# Patient Record
Sex: FEMALE | Race: WHITE | NOT HISPANIC OR LATINO | Employment: FULL TIME | URBAN - METROPOLITAN AREA
[De-identification: names, ages, dates, MRNs, and addresses within clinical notes are randomized per-mention and may not be internally consistent; named-entity substitution may affect disease eponyms.]

---

## 2021-08-04 ENCOUNTER — OFFICE VISIT (OUTPATIENT)
Dept: URGENT CARE | Facility: CLINIC | Age: 41
End: 2021-08-04
Payer: COMMERCIAL

## 2021-08-04 VITALS
HEART RATE: 97 BPM | SYSTOLIC BLOOD PRESSURE: 115 MMHG | TEMPERATURE: 98 F | WEIGHT: 125 LBS | RESPIRATION RATE: 18 BRPM | BODY MASS INDEX: 18.51 KG/M2 | HEIGHT: 69 IN | DIASTOLIC BLOOD PRESSURE: 80 MMHG | OXYGEN SATURATION: 97 %

## 2021-08-04 DIAGNOSIS — J34.9 SINUS PROBLEM: ICD-10-CM

## 2021-08-04 DIAGNOSIS — J01.20 ACUTE ETHMOIDAL SINUSITIS, RECURRENCE NOT SPECIFIED: Primary | ICD-10-CM

## 2021-08-04 LAB
CTP QC/QA: YES
SARS-COV-2 RDRP RESP QL NAA+PROBE: NEGATIVE

## 2021-08-04 PROCEDURE — 99214 OFFICE O/P EST MOD 30 MIN: CPT | Mod: S$GLB,,, | Performed by: NURSE PRACTITIONER

## 2021-08-04 PROCEDURE — U0002: ICD-10-PCS | Mod: QW,S$GLB,, | Performed by: NURSE PRACTITIONER

## 2021-08-04 PROCEDURE — 99214 PR OFFICE/OUTPT VISIT, EST, LEVL IV, 30-39 MIN: ICD-10-PCS | Mod: S$GLB,,, | Performed by: NURSE PRACTITIONER

## 2021-08-04 PROCEDURE — U0002 COVID-19 LAB TEST NON-CDC: HCPCS | Mod: QW,S$GLB,, | Performed by: NURSE PRACTITIONER

## 2021-08-04 RX ORDER — FLUTICASONE PROPIONATE 50 MCG
1 SPRAY, SUSPENSION (ML) NASAL DAILY
Qty: 15.8 ML | Refills: 1 | Status: SHIPPED | OUTPATIENT
Start: 2021-08-04 | End: 2021-11-01

## 2021-08-04 RX ORDER — LEVOTHYROXINE SODIUM 75 UG/1
75 TABLET ORAL
COMMUNITY
End: 2021-11-01 | Stop reason: SDUPTHER

## 2021-08-04 RX ORDER — AZELASTINE 1 MG/ML
1 SPRAY, METERED NASAL 2 TIMES DAILY
Qty: 30 ML | Refills: 1 | Status: SHIPPED | OUTPATIENT
Start: 2021-08-04 | End: 2021-11-01

## 2021-11-01 ENCOUNTER — TELEPHONE (OUTPATIENT)
Dept: UROGYNECOLOGY | Facility: CLINIC | Age: 41
End: 2021-11-01
Payer: COMMERCIAL

## 2021-11-01 ENCOUNTER — TELEPHONE (OUTPATIENT)
Dept: PSYCHIATRY | Facility: CLINIC | Age: 41
End: 2021-11-01
Payer: COMMERCIAL

## 2021-11-01 ENCOUNTER — OFFICE VISIT (OUTPATIENT)
Dept: OBSTETRICS AND GYNECOLOGY | Facility: CLINIC | Age: 41
End: 2021-11-01
Payer: COMMERCIAL

## 2021-11-01 ENCOUNTER — TELEPHONE (OUTPATIENT)
Dept: OBSTETRICS AND GYNECOLOGY | Facility: CLINIC | Age: 41
End: 2021-11-01
Payer: COMMERCIAL

## 2021-11-01 VITALS
SYSTOLIC BLOOD PRESSURE: 120 MMHG | HEIGHT: 69 IN | DIASTOLIC BLOOD PRESSURE: 68 MMHG | BODY MASS INDEX: 19.24 KG/M2 | WEIGHT: 129.88 LBS

## 2021-11-01 DIAGNOSIS — N64.4 MASTODYNIA OF RIGHT BREAST: Primary | ICD-10-CM

## 2021-11-01 DIAGNOSIS — Z87.828 HISTORY OF GUNSHOT WOUND: ICD-10-CM

## 2021-11-01 DIAGNOSIS — F43.10 PTSD (POST-TRAUMATIC STRESS DISORDER): ICD-10-CM

## 2021-11-01 DIAGNOSIS — F41.9 ANXIETY: ICD-10-CM

## 2021-11-01 PROCEDURE — 99999 PR PBB SHADOW E&M-EST. PATIENT-LVL III: CPT | Mod: PBBFAC,,, | Performed by: NURSE PRACTITIONER

## 2021-11-01 PROCEDURE — 99999 PR PBB SHADOW E&M-EST. PATIENT-LVL III: ICD-10-PCS | Mod: PBBFAC,,, | Performed by: NURSE PRACTITIONER

## 2021-11-01 PROCEDURE — 99213 PR OFFICE/OUTPT VISIT, EST, LEVL III, 20-29 MIN: ICD-10-PCS | Mod: S$GLB,,, | Performed by: NURSE PRACTITIONER

## 2021-11-01 PROCEDURE — 99213 OFFICE O/P EST LOW 20 MIN: CPT | Mod: S$GLB,,, | Performed by: NURSE PRACTITIONER

## 2021-11-01 RX ORDER — LEVOTHYROXINE SODIUM 75 UG/1
1 TABLET ORAL EVERY MORNING
COMMUNITY
Start: 2021-08-11 | End: 2022-08-31 | Stop reason: SDUPTHER

## 2021-11-01 RX ORDER — NITROFURANTOIN 25; 75 MG/1; MG/1
100 CAPSULE ORAL ONCE
COMMUNITY
Start: 2021-10-02 | End: 2021-11-01

## 2021-12-21 ENCOUNTER — PATIENT MESSAGE (OUTPATIENT)
Dept: OBSTETRICS AND GYNECOLOGY | Facility: CLINIC | Age: 41
End: 2021-12-21
Payer: COMMERCIAL

## 2021-12-21 ENCOUNTER — TELEPHONE (OUTPATIENT)
Dept: OBSTETRICS AND GYNECOLOGY | Facility: CLINIC | Age: 41
End: 2021-12-21
Payer: COMMERCIAL

## 2021-12-27 ENCOUNTER — PATIENT MESSAGE (OUTPATIENT)
Dept: PSYCHIATRY | Facility: CLINIC | Age: 41
End: 2021-12-27
Payer: COMMERCIAL
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
## 2021-12-29 ENCOUNTER — OFFICE VISIT (OUTPATIENT)
Dept: PSYCHIATRY | Facility: CLINIC | Age: 41
End: 2021-12-29
Payer: COMMERCIAL

## 2021-12-29 VITALS — HEART RATE: 89 BPM

## 2021-12-29 DIAGNOSIS — F41.9 ANXIETY: ICD-10-CM

## 2021-12-29 DIAGNOSIS — F43.10 PTSD (POST-TRAUMATIC STRESS DISORDER): ICD-10-CM

## 2021-12-29 PROCEDURE — 90792 PSYCH DIAG EVAL W/MED SRVCS: CPT | Mod: 95,,, | Performed by: PSYCHIATRY & NEUROLOGY

## 2021-12-29 PROCEDURE — 90792 PR PSYCHIATRIC DIAGNOSTIC EVALUATION W/MEDICAL SERVICES: ICD-10-PCS | Mod: 95,,, | Performed by: PSYCHIATRY & NEUROLOGY

## 2021-12-29 RX ORDER — NITROFURANTOIN (MACROCRYSTALS) 100 MG/1
100 CAPSULE ORAL EVERY 6 HOURS
COMMUNITY
End: 2023-06-21 | Stop reason: SDUPTHER

## 2022-01-03 RX ORDER — LAMOTRIGINE 25 MG/1
25 TABLET ORAL DAILY
Qty: 42 TABLET | Refills: 0 | Status: SHIPPED | OUTPATIENT
Start: 2022-01-03 | End: 2022-01-31

## 2022-01-03 NOTE — PATIENT INSTRUCTIONS
1. Start lamictal 25mg daily for two weeks, then increase to 50mg daily for two weeks.      Follow up in four weeks.

## 2022-01-25 ENCOUNTER — OFFICE VISIT (OUTPATIENT)
Dept: OBSTETRICS AND GYNECOLOGY | Facility: CLINIC | Age: 42
End: 2022-01-25
Payer: COMMERCIAL

## 2022-01-25 VITALS
SYSTOLIC BLOOD PRESSURE: 100 MMHG | HEIGHT: 69 IN | BODY MASS INDEX: 19.07 KG/M2 | DIASTOLIC BLOOD PRESSURE: 60 MMHG | WEIGHT: 128.75 LBS

## 2022-01-25 DIAGNOSIS — Z12.4 PAP SMEAR FOR CERVICAL CANCER SCREENING: ICD-10-CM

## 2022-01-25 DIAGNOSIS — Z01.419 WOMEN'S ANNUAL ROUTINE GYNECOLOGICAL EXAMINATION: Primary | ICD-10-CM

## 2022-01-25 DIAGNOSIS — Z11.51 ENCOUNTER FOR SCREENING FOR HUMAN PAPILLOMAVIRUS (HPV): ICD-10-CM

## 2022-01-25 DIAGNOSIS — Z12.31 OTHER SCREENING MAMMOGRAM: ICD-10-CM

## 2022-01-25 DIAGNOSIS — N63.10 MASS OF RIGHT BREAST: ICD-10-CM

## 2022-01-25 PROCEDURE — 88175 CYTOPATH C/V AUTO FLUID REDO: CPT | Performed by: NURSE PRACTITIONER

## 2022-01-25 PROCEDURE — 99395 PR PREVENTIVE VISIT,EST,18-39: ICD-10-PCS | Mod: S$GLB,,, | Performed by: NURSE PRACTITIONER

## 2022-01-25 PROCEDURE — 99395 PREV VISIT EST AGE 18-39: CPT | Mod: S$GLB,,, | Performed by: NURSE PRACTITIONER

## 2022-01-25 PROCEDURE — 99999 PR PBB SHADOW E&M-EST. PATIENT-LVL III: ICD-10-PCS | Mod: PBBFAC,,, | Performed by: NURSE PRACTITIONER

## 2022-01-25 PROCEDURE — 87624 HPV HI-RISK TYP POOLED RSLT: CPT | Performed by: NURSE PRACTITIONER

## 2022-01-25 PROCEDURE — 99999 PR PBB SHADOW E&M-EST. PATIENT-LVL III: CPT | Mod: PBBFAC,,, | Performed by: NURSE PRACTITIONER

## 2022-01-29 ENCOUNTER — PATIENT MESSAGE (OUTPATIENT)
Dept: PSYCHIATRY | Facility: CLINIC | Age: 42
End: 2022-01-29
Payer: COMMERCIAL

## 2022-01-31 ENCOUNTER — OFFICE VISIT (OUTPATIENT)
Dept: PSYCHIATRY | Facility: CLINIC | Age: 42
End: 2022-01-31
Payer: COMMERCIAL

## 2022-01-31 VITALS — HEIGHT: 69 IN | WEIGHT: 126 LBS | BODY MASS INDEX: 18.66 KG/M2

## 2022-01-31 DIAGNOSIS — F41.9 ANXIETY: Primary | ICD-10-CM

## 2022-01-31 DIAGNOSIS — F43.10 PTSD (POST-TRAUMATIC STRESS DISORDER): ICD-10-CM

## 2022-01-31 LAB
FINAL PATHOLOGIC DIAGNOSIS: NORMAL
Lab: NORMAL

## 2022-01-31 PROCEDURE — 99214 OFFICE O/P EST MOD 30 MIN: CPT | Mod: 95,,, | Performed by: PSYCHIATRY & NEUROLOGY

## 2022-01-31 PROCEDURE — 99214 PR OFFICE/OUTPT VISIT, EST, LEVL IV, 30-39 MIN: ICD-10-PCS | Mod: 95,,, | Performed by: PSYCHIATRY & NEUROLOGY

## 2022-01-31 RX ORDER — LAMOTRIGINE 25 MG/1
25 TABLET ORAL DAILY
Qty: 60 TABLET | Refills: 4 | Status: SHIPPED | OUTPATIENT
Start: 2022-01-31 | End: 2022-03-08 | Stop reason: SDUPTHER

## 2022-01-31 NOTE — PROGRESS NOTES
"Subsequent Psychiatric Session-VIRTUAL    41 y.o. female    Reason for Follow Up:   Post-traumatic stress disorder, Anxiety    Current Medications:    Current Outpatient Medications:     levothyroxine (SYNTHROID) 75 MCG tablet, Take 1 tablet by mouth every morning., Disp: , Rfl:     nitrofurantoin (MACRODANTIN) 100 MG capsule, Take 100 mg by mouth every 6 (six) hours., Disp: , Rfl:     lamoTRIgine (LAMICTAL) 25 MG tablet, Take 1 tablet (25 mg total) by mouth once daily., Disp: 60 tablet, Rfl: 4     Date of Last Visit:   12/29/21    In Clinic Since:   December 2021  Therapy:    Not currently in therapy    Interval History  Patient describes current mood as "okay." She was starting to feel better before she stopped the lamictal two days ago. She felt better starting last week. She did not have as much emotional stress. She felt productive and could get errands. She stopped the medication, because her body felt itchy and she had several small spots like bug bites and an enlarged lymph node behind her left ear. Denies medication side effects. Denies HI, AVH. She has passive suicidal thoughts several days over the past two weeks without intent or plan. Thoughts are related to feeling like a bad mother.        She quit her full time job in late October. She is not sure she is ready to go back full-time and being in a new space.     Manic/Hypomanic Symptom Screening:  Since the last session, have you had any periods lasting at least a few days where your energy level was higher than normal and you did not need as much sleep at night to function the following day?: Denies. Three days after increasing the medication, she slept for three days straight.    Substance Use Screening:  Alcohol: 2/30 days  Cannabis: Denies  Anything recreational that is not prescribed: Denies    Review of Measures  PHQ-9: 17  YADY-7: 13    Scores from last session:  PHQ-9: 17  YADY-7: 17    Review of PDMP  Reviewed    Constitutional     VITAL " "SIGNS  Temp     BP      HR      RR      SpO2           Recent Results (from the past 672 hour(s))   Liquid-Based Pap Smear, Screening    Collection Time: 01/25/22 11:02 AM   Result Value Ref Range    Final Pathologic Diagnosis       Specimen Adequacy  Satisfactory for interpretation. Endocervical component is absent.  Krebs Category  Negative for intraepithelial lesion or malignancy.      Disclaimer       The Pap smear is a screening test that aids in the detection of cervical  cancer and cancer precursors. Both false positive and false negative results  can occur. The test should be used at regular intervals, and positive results  should be confirmed before definitive therapy.  This liquid based specimen is processed using the  or  Thin PrepPAP  System. This specimen has been analyzed by the ThinPrep Imaging System  ([employer]), an automated imaging and review system which assists  the laboratory in evaluating cells on ThinPrep PAP tests. Following automated  imaging, selected fields from every slide are reviewed by a cytotechnologist  and/or pathologist.  Screening was performed at [facility], [address], [place] [address].     HPV High Risk Genotypes, PCR    Collection Time: 01/25/22 11:02 AM   Result Value Ref Range    HPV other High Risk types, PCR Negative Negative    HPV High Risk type 16, PCR Negative Negative    HPV High Risk type 18, PCR Negative Negative        MENTAL STATUS EXAM  General:  Alert and oriented x 4 Appearance is good grooming and hygiene, appears stated age. Behavior: friendly and cooperative, eye contact normal.  Gait, Posture and Muscle Strength/Tone: Normal posture observed. No gross abnormal movements noted.  Psychomotor Agitation and Retardation: none evident  Speech: Normal rate, volume, articulation, and tone.  Mood: "okay"  Affect: anxious  Thought Process: Linear and coherent. No flight of " ideas.  Associations:  Intact. No loosening of associations.  Thought content: Denies suicidal and homicidal thoughts, plans and intentions.  Perceptions: Denies any auditory or visual hallucinations. Does not appear internally preoccupied.  Orientation: Alert and oriented to person, place, date and situation  Attention and Concentration: Intact.   Memory: Intact grossly   Language: No deficits noted   Fund of Knowledge: appropriate for age and level and education.   Insight:   good  Judgment: good  Impulse control: good      ASSESSMENT  Problem List Items Addressed This Visit        Psychiatric    PTSD (post-traumatic stress disorder)    Relevant Medications    lamoTRIgine (LAMICTAL) 25 MG tablet    Anxiety - Primary    Relevant Medications    lamoTRIgine (LAMICTAL) 25 MG tablet        Patient advised that mild rash can occur that is not SJS. She is given some more education about SJS and advised to look out for worsening rash or symptoms. She was improving with medication.          PLAN  Patient Instructions   1. Continue lamictal from 50mg daily.  2. Pomodoro technique  3.   These more severe symptoms can include:    fever  joint pain  muscle pain  general discomfort  swelling of the lymph nodes around the neck  high count of eosinophils (a type of immune cell) in the blood    Follow up in 2 weeks           -------------------------------------------------------------------------------    Jud Saenz  Catholic - PSYCHIATRY    Due to the pandemic, today's session was performed over [facility]. Patient is confirmed to be in the [facility]. The participants are patient and myself.    Today's encounter took a total time of 30 minutes, and that time included patient interview and documentation.    Risks, Benefits, Side Effects and Alternatives were discussed with the patient today and consent was obtained for the current medication regimen. The patient was encouraged to ask questions and these questions were  answered and the patient was engaged in psychoeducation regarding diagnosis, course of illness, accuracy of the diagnosis, and other general elements regarding overall diagnosis and treatment consideration.     Any medications being used off-label were discussed with the patient inclusive of the evidence base for the use of the medications and consent was obtained for the off-label use of the medication.     Brief suicide risk assessment was performed with the patient today and safety planning was performed if indicated.    Note completed by: Jud Saenz MD, 2/2/2022 11:00 AM
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
## 2022-02-01 LAB
HPV HR 12 DNA SPEC QL NAA+PROBE: NEGATIVE
HPV16 AG SPEC QL: NEGATIVE
HPV18 DNA SPEC QL NAA+PROBE: NEGATIVE

## 2022-02-02 ENCOUNTER — PATIENT MESSAGE (OUTPATIENT)
Dept: OBSTETRICS AND GYNECOLOGY | Facility: CLINIC | Age: 42
End: 2022-02-02
Payer: COMMERCIAL

## 2022-02-11 ENCOUNTER — PATIENT MESSAGE (OUTPATIENT)
Dept: PSYCHIATRY | Facility: CLINIC | Age: 42
End: 2022-02-11
Payer: COMMERCIAL

## 2022-02-13 ENCOUNTER — PATIENT MESSAGE (OUTPATIENT)
Dept: PSYCHIATRY | Facility: CLINIC | Age: 42
End: 2022-02-13
Payer: COMMERCIAL

## 2022-02-21 ENCOUNTER — PATIENT MESSAGE (OUTPATIENT)
Dept: OBSTETRICS AND GYNECOLOGY | Facility: CLINIC | Age: 42
End: 2022-02-21
Payer: COMMERCIAL

## 2022-02-21 ENCOUNTER — HOSPITAL ENCOUNTER (OUTPATIENT)
Dept: RADIOLOGY | Facility: HOSPITAL | Age: 42
Discharge: HOME | End: 2022-02-21
Attending: NURSE PRACTITIONER
Payer: COMMERCIAL

## 2022-02-21 VITALS — BODY MASS INDEX: 18.75 KG/M2 | WEIGHT: 127 LBS

## 2022-02-21 DIAGNOSIS — N63.10 MASS OF RIGHT BREAST: ICD-10-CM

## 2022-02-21 PROCEDURE — 76642 ULTRASOUND BREAST LIMITED: CPT | Mod: TC,RT

## 2022-02-21 PROCEDURE — 77062 MAMMO DIGITAL DIAGNOSTIC BILAT WITH TOMO: ICD-10-PCS | Mod: 26,,, | Performed by: RADIOLOGY

## 2022-02-21 PROCEDURE — 76642 US BREAST RIGHT LIMITED: ICD-10-PCS | Mod: 26,RT,, | Performed by: RADIOLOGY

## 2022-02-21 PROCEDURE — 77062 BREAST TOMOSYNTHESIS BI: CPT | Mod: 26,,, | Performed by: RADIOLOGY

## 2022-02-21 PROCEDURE — 76642 ULTRASOUND BREAST LIMITED: CPT | Mod: 26,RT,, | Performed by: RADIOLOGY

## 2022-02-21 PROCEDURE — 77066 MAMMO DIGITAL DIAGNOSTIC BILAT WITH TOMO: ICD-10-PCS | Mod: 26,,, | Performed by: RADIOLOGY

## 2022-02-21 PROCEDURE — 77062 BREAST TOMOSYNTHESIS BI: CPT | Mod: TC

## 2022-02-21 PROCEDURE — 77066 DX MAMMO INCL CAD BI: CPT | Mod: 26,,, | Performed by: RADIOLOGY

## 2022-03-08 ENCOUNTER — PATIENT MESSAGE (OUTPATIENT)
Dept: PSYCHIATRY | Facility: CLINIC | Age: 42
End: 2022-03-08
Payer: COMMERCIAL

## 2022-03-08 DIAGNOSIS — F43.10 PTSD (POST-TRAUMATIC STRESS DISORDER): ICD-10-CM

## 2022-03-08 DIAGNOSIS — F41.9 ANXIETY: ICD-10-CM

## 2022-03-08 RX ORDER — LAMOTRIGINE 25 MG/1
75 TABLET ORAL DAILY
Qty: 90 TABLET | Refills: 1 | Status: SHIPPED | OUTPATIENT
Start: 2022-03-08 | End: 2022-05-11

## 2022-03-08 NOTE — TELEPHONE ENCOUNTER
Requested Prescriptions     Pending Prescriptions Disp Refills    lamoTRIgine (LAMICTAL) 25 MG tablet 90 tablet 1     Sig: Take 3 tablets (75 mg total) by mouth once daily.     I am the doctor covering for  while she is out.    BRYAN MARIE MD   Department of Psychiatry   [facility]  3/8/2022 4:56 PM

## 2022-03-11 ENCOUNTER — PATIENT MESSAGE (OUTPATIENT)
Dept: OBSTETRICS AND GYNECOLOGY | Facility: CLINIC | Age: 42
End: 2022-03-11
Payer: COMMERCIAL

## 2022-03-11 DIAGNOSIS — N39.0 RECURRENT UTI: Primary | ICD-10-CM

## 2022-03-11 RX ORDER — NITROFURANTOIN 25; 75 MG/1; MG/1
100 CAPSULE ORAL DAILY PRN
Qty: 90 CAPSULE | Refills: 3 | Status: SHIPPED | OUTPATIENT
Start: 2022-03-11 | End: 2022-06-09

## 2022-05-10 ENCOUNTER — PATIENT MESSAGE (OUTPATIENT)
Dept: PSYCHIATRY | Facility: CLINIC | Age: 42
End: 2022-05-10
Payer: COMMERCIAL

## 2022-05-11 ENCOUNTER — PATIENT MESSAGE (OUTPATIENT)
Dept: PSYCHIATRY | Facility: CLINIC | Age: 42
End: 2022-05-11
Payer: COMMERCIAL

## 2022-05-11 ENCOUNTER — OFFICE VISIT (OUTPATIENT)
Dept: PSYCHIATRY | Facility: CLINIC | Age: 42
End: 2022-05-11
Payer: COMMERCIAL

## 2022-05-11 VITALS — BODY MASS INDEX: 18.07 KG/M2 | HEIGHT: 69 IN | HEART RATE: 84 BPM | WEIGHT: 122 LBS

## 2022-05-11 DIAGNOSIS — F43.10 PTSD (POST-TRAUMATIC STRESS DISORDER): Primary | ICD-10-CM

## 2022-05-11 DIAGNOSIS — F41.9 ANXIETY: ICD-10-CM

## 2022-05-11 PROCEDURE — 99214 OFFICE O/P EST MOD 30 MIN: CPT | Mod: 95,,, | Performed by: PSYCHIATRY & NEUROLOGY

## 2022-05-11 PROCEDURE — 99214 PR OFFICE/OUTPT VISIT, EST, LEVL IV, 30-39 MIN: ICD-10-PCS | Mod: 95,,, | Performed by: PSYCHIATRY & NEUROLOGY

## 2022-05-11 RX ORDER — LAMOTRIGINE 100 MG/1
100 TABLET ORAL DAILY
Qty: 30 TABLET | Refills: 2 | Status: SHIPPED | OUTPATIENT
Start: 2022-05-11 | End: 2022-08-09 | Stop reason: SDUPTHER

## 2022-05-11 RX ORDER — CLONAZEPAM 1 MG/1
1 TABLET, ORALLY DISINTEGRATING ORAL DAILY PRN
Qty: 30 TABLET | Refills: 0 | Status: SHIPPED | OUTPATIENT
Start: 2022-05-11 | End: 2022-06-21 | Stop reason: SDUPTHER

## 2022-05-11 NOTE — PATIENT INSTRUCTIONS
Increase Lamictal from 75mg daily to 100mg daily.  Start klonopin 1mg daily as needed for severe anxiety and panic.  Lab next visit    Follow up in one month.

## 2022-05-11 NOTE — PROGRESS NOTES
"Subsequent Psychiatric Session-VIRTUAL    41 y.o. female    Reason for Follow Up:   Post-traumatic stress disorder, Anxiety    Current Medications:    Current Outpatient Medications:     clonazePAM (KLONOPIN) 1 MG disintegrating tablet, Take 1 tablet (1 mg total) by mouth daily as needed (severe anxiety and panic)., Disp: 30 tablet, Rfl: 0    lamoTRIgine (LAMICTAL) 100 MG tablet, Take 1 tablet (100 mg total) by mouth once daily., Disp: 30 tablet, Rfl: 2    levothyroxine (SYNTHROID) 75 MCG tablet, Take 1 tablet by mouth every morning., Disp: , Rfl:     nitrofurantoin (MACRODANTIN) 100 MG capsule, Take 100 mg by mouth every 6 (six) hours., Disp: , Rfl:     nitrofurantoin, macrocrystal-monohydrate, (MACROBID) 100 MG capsule, Take 1 capsule (100 mg total) by mouth daily as needed (UTI prevention)., Disp: 90 capsule, Rfl: 3     Date of Last Visit:   01/31/22    In Clinic Since:   December 2021  Therapy:    Not currently in therapy    Interval History  She started working part-time in March. She has felt "more steady emotionally" and "not as reactive." Her anxiety has been higher over the past couple of months. Panic attacks have been bad, usually happening when she is out of the house with the kids. She still has trouble taking the kids for more than a couple days. She is still having nightmares and flashbacks. She has tried prazosin in the past and she has passed out with it. Xanax was helpful in the past, but she used it in a limited way. She has tried to see two therapists since we last spoke, but they reacted too strongly to her trauma story and she did not want to go back. Encouraged her to try again and ask to work on present-day issues without talking about the event.    They are going to [place] for her birthday.    Denies SI, HI, AVH. No medication side effects, including rash.    Manic/Hypomanic Symptom Screening:  Since the last session, have you had any periods lasting at least a few days where your " "energy level was higher than normal and you did not need as much sleep at night to function the following day?: Does not mention change in sleep.    Substance Use Screening:  Not asked this session    Review of Measures  Not performed this session.    Scores from last session:  PHQ-9: 17  YADY-7: 13    PHQ-9: 17  YADY-7: 17    Review of PDMP  Reviewed    Constitutional     VITAL SIGNS  Temp     BP      HR 84    RR      SpO2           No results found for this or any previous visit (from the past 672 hour(s)).     MENTAL STATUS EXAM  General:  Alert and oriented x 4 Appearance is good grooming and hygiene, appears stated age. Behavior: friendly and cooperative, eye contact normal.  Gait, Posture and Muscle Strength/Tone: Normal posture observed. No gross abnormal movements noted.  Psychomotor Agitation and Retardation: none evident  Speech: Normal rate, volume, articulation, and tone.  Mood: "more steady"  Affect: full  Thought Process: Linear and coherent. No flight of ideas.  Associations:  Intact. No loosening of associations.  Thought content: Denies suicidal and homicidal thoughts, plans and intentions.  Perceptions: Denies any auditory or visual hallucinations. Does not appear internally preoccupied.  Orientation: Alert and oriented to person, place, date and situation  Attention and Concentration: Intact.   Memory: Intact grossly   Language: No deficits noted   Fund of Knowledge: appropriate for age and level and education.   Insight:   good  Judgment: good  Impulse control: good      ASSESSMENT  Problem List Items Addressed This Visit        Psychiatric    PTSD (post-traumatic stress disorder) - Primary    Relevant Medications    clonazePAM (KLONOPIN) 1 MG disintegrating tablet    lamoTRIgine (LAMICTAL) 100 MG tablet    Anxiety    Relevant Medications    clonazePAM (KLONOPIN) 1 MG disintegrating tablet    lamoTRIgine (LAMICTAL) 100 MG tablet        Now that her mood is better, anxiety is more prominent. Will " increase lamictal to 100mg. Can try adding abilify or a SSRI at next visit to address anxiety.    PLAN  Patient Instructions   1. Increase Lamictal from 75mg daily to 100mg daily.  2. Start klonopin 1mg daily as needed for severe anxiety and panic.  3. Lab next visit    Follow up in one month.           -------------------------------------------------------------------------------    Jud Saenz  [employer] - PSYCHIATRY    Due to the pandemic, today's session was performed over [facility]. Patient is confirmed to be in the [facility]. The participants are patient and myself.    Today's encounter took a total time of 30 minutes, and that time included patient interview and documentation.    Risks, Benefits, Side Effects and Alternatives were discussed with the patient today and consent was obtained for the current medication regimen. The patient was encouraged to ask questions and these questions were answered and the patient was engaged in psychoeducation regarding diagnosis, course of illness, accuracy of the diagnosis, and other general elements regarding overall diagnosis and treatment consideration.     Any medications being used off-label were discussed with the patient inclusive of the evidence base for the use of the medications and consent was obtained for the off-label use of the medication.     Brief suicide risk assessment was performed with the patient today and safety planning was performed if indicated.    Note completed by: Jud Saenz MD, 5/11/2022 11:00 AM

## 2022-05-26 ENCOUNTER — PATIENT MESSAGE (OUTPATIENT)
Dept: PSYCHIATRY | Facility: CLINIC | Age: 42
End: 2022-05-26
Payer: COMMERCIAL

## 2022-06-15 ENCOUNTER — PATIENT MESSAGE (OUTPATIENT)
Dept: OBSTETRICS AND GYNECOLOGY | Facility: CLINIC | Age: 42
End: 2022-06-15
Payer: COMMERCIAL

## 2022-06-20 ENCOUNTER — PATIENT MESSAGE (OUTPATIENT)
Dept: PSYCHIATRY | Facility: CLINIC | Age: 42
End: 2022-06-20
Payer: COMMERCIAL

## 2022-06-21 ENCOUNTER — OFFICE VISIT (OUTPATIENT)
Dept: PSYCHIATRY | Facility: CLINIC | Age: 42
End: 2022-06-21
Payer: COMMERCIAL

## 2022-06-21 VITALS — WEIGHT: 121 LBS | BODY MASS INDEX: 17.86 KG/M2 | HEART RATE: 78 BPM

## 2022-06-21 DIAGNOSIS — F43.10 PTSD (POST-TRAUMATIC STRESS DISORDER): ICD-10-CM

## 2022-06-21 DIAGNOSIS — F41.9 ANXIETY: ICD-10-CM

## 2022-06-21 PROCEDURE — 99214 PR OFFICE/OUTPT VISIT, EST, LEVL IV, 30-39 MIN: ICD-10-PCS | Mod: 95,,, | Performed by: PSYCHIATRY & NEUROLOGY

## 2022-06-21 PROCEDURE — 99214 OFFICE O/P EST MOD 30 MIN: CPT | Mod: 95,,, | Performed by: PSYCHIATRY & NEUROLOGY

## 2022-06-21 RX ORDER — CLONAZEPAM 1 MG/1
1 TABLET, ORALLY DISINTEGRATING ORAL DAILY PRN
Qty: 30 TABLET | Refills: 0 | Status: SHIPPED | OUTPATIENT
Start: 2022-07-05 | End: 2022-08-14

## 2022-06-21 NOTE — PROGRESS NOTES
Subsequent Psychiatric Session-VIRTUAL    42 y.o. female    Reason for Follow Up:   Post-traumatic stress disorder, Anxiety    Current Medications:    Current Outpatient Medications:     lamoTRIgine (LAMICTAL) 100 MG tablet, Take 1 tablet (100 mg total) by mouth once daily., Disp: 30 tablet, Rfl: 2    levothyroxine (SYNTHROID) 75 MCG tablet, Take 1 tablet by mouth every morning., Disp: , Rfl:     nitrofurantoin (MACRODANTIN) 100 MG capsule, Take 100 mg by mouth every 6 (six) hours., Disp: , Rfl:     [START ON 7/5/2022] clonazePAM (KLONOPIN) 1 MG disintegrating tablet, Take 1 tablet (1 mg total) by mouth daily as needed (severe anxiety and panic)., Disp: 30 tablet, Rfl: 0     Date of Last Visit:   05/11/22    In Clinic Since:   December 2021  Therapy:    Not currently in therapy. She tried to see two therapists recently, but they reacted too strongly to her trauma story and she did not want to go back.      Interval History  Patient says she is doing much better, particularly with anxiety. She began to notice a difference about 2 weeks ago. She has had one panic attack since last session, when in a darkened studio for cycling training. She is still having nightmares and flashbacks. She has tried prazosin in the past and she has passed out with it. She has about 10 Klonopin left still. She takes a half dose in the mornings when she has her children. She is able to relax and enjoy their company more. She has been feeling tired. It takes her 1-2 hours to fall asleep. She rates anxiety as 4-5/10, but depression is still 5-6/10. She denies SI, HI, AVH. No medication side effects, including rash.     Manic/Hypomanic Symptom Screening:  Since the last session, have you had any periods lasting at least a few days where your energy level was higher than normal and you did not need as much sleep at night to function the following day?: Denies    Substance Use Screening:  Not asked this session    Review of Measures  PHQ-9:  "12  YADY-7: 5    Scores from last session:  Not performed last session.    PHQ-9: 17  YADY-7: 13    PHQ-9: 17  YADY-7: 17    Review of PDMP  Reviewed    Constitutional     VITAL SIGNS  Temp     BP      HR 78    RR      SpO2           No results found for this or any previous visit (from the past 672 hour(s)).     MENTAL STATUS EXAM  General:  Alert and oriented x 4 Appearance is good grooming and hygiene, appears stated age, BMI 17.86. Behavior: friendly and cooperative, eye contact normal.  Gait, Posture and Muscle Strength/Tone: Normal posture observed. No gross abnormal movements noted.  Psychomotor Agitation and Retardation: none evident  Speech: Normal rate, volume, articulation, and tone.  Mood: "better"  Affect: full  Thought Process: Linear and coherent. No flight of ideas.  Associations:  Intact. No loosening of associations.  Thought content: Denies suicidal and homicidal thoughts, plans and intentions.  Perceptions: Denies any auditory or visual hallucinations. Does not appear internally preoccupied.  Orientation: Alert and oriented to person, place, date and situation  Attention and Concentration: Intact.   Memory: Intact grossly   Language: No deficits noted   Fund of Knowledge: appropriate for age and level and education.   Insight:   good  Judgment: good  Impulse control: good      ASSESSMENT  Problem List Items Addressed This Visit        Psychiatric    PTSD (post-traumatic stress disorder)    Relevant Medications    clonazePAM (KLONOPIN) 1 MG disintegrating tablet (Start on 7/5/2022)    Other Relevant Orders    TSH    Vitamin B12    Vitamin D    CBC Without Differential    Comprehensive Metabolic Panel    Folate    LAMOTRIGINE LEVEL    Anxiety    Relevant Medications    clonazePAM (KLONOPIN) 1 MG disintegrating tablet (Start on 7/5/2022)    Other Relevant Orders    TSH    Vitamin B12    Vitamin D    CBC Without Differential    Comprehensive Metabolic Panel    Folate    LAMOTRIGINE LEVEL        Gave her " sleep hygiene tips. She wants to try leaving bed after 15 minutes of not being able to sleep and pushing back bedtime. Can try adding abilify or a SSRI at next visit. Saw BMI only after visit had concluded. Will ask about weight at next visit.    PLAN  Patient Instructions   1. Labs today  2. Continue Lamictal 100mg in the evening.  3. Continue as needed Klonopin 0.5mg to 1mg.    Follow up in 4 weeks.           -------------------------------------------------------------------------------    Jud Saenz  [employer] - PSYCHIATRY    Due to the pandemic, today's session was performed over [facility]. Patient is confirmed to be in the [facility]. The participants are patient and myself.    Today's encounter took a total time of 30 minutes, and that time included patient interview and documentation and ordering labs and medication.    Risks, Benefits, Side Effects and Alternatives were discussed with the patient today and consent was obtained for the current medication regimen. The patient was encouraged to ask questions and these questions were answered and the patient was engaged in psychoeducation regarding diagnosis, course of illness, accuracy of the diagnosis, and other general elements regarding overall diagnosis and treatment consideration.     Any medications being used off-label were discussed with the patient inclusive of the evidence base for the use of the medications and consent was obtained for the off-label use of the medication.     Brief suicide risk assessment was performed with the patient today and safety planning was performed if indicated.    Note completed by: Jud Saenz MD, 6/21/2022 11:00 AM

## 2022-06-21 NOTE — PATIENT INSTRUCTIONS
Labs today  Continue Lamictal 100mg in the evening.  Continue as needed Klonopin 0.5mg to 1mg.    Follow up in 4 weeks.

## 2022-08-02 ENCOUNTER — PATIENT MESSAGE (OUTPATIENT)
Dept: OBSTETRICS AND GYNECOLOGY | Facility: CLINIC | Age: 42
End: 2022-08-02
Payer: COMMERCIAL

## 2022-08-09 ENCOUNTER — LAB VISIT (OUTPATIENT)
Dept: LAB | Facility: OTHER | Age: 42
End: 2022-08-09
Attending: PSYCHIATRY & NEUROLOGY
Payer: COMMERCIAL

## 2022-08-09 DIAGNOSIS — F43.10 PTSD (POST-TRAUMATIC STRESS DISORDER): ICD-10-CM

## 2022-08-09 DIAGNOSIS — F41.9 ANXIETY: ICD-10-CM

## 2022-08-09 LAB
25(OH)D3+25(OH)D2 SERPL-MCNC: 30 NG/ML (ref 30–96)
ALBUMIN SERPL BCP-MCNC: 4 G/DL (ref 3.5–5.2)
ALP SERPL-CCNC: 50 U/L (ref 55–135)
ALT SERPL W/O P-5'-P-CCNC: 17 U/L (ref 10–44)
ANION GAP SERPL CALC-SCNC: 11 MMOL/L (ref 8–16)
AST SERPL-CCNC: 23 U/L (ref 10–40)
BILIRUB SERPL-MCNC: 0.6 MG/DL (ref 0.1–1)
BUN SERPL-MCNC: 10 MG/DL (ref 6–20)
CALCIUM SERPL-MCNC: 9.3 MG/DL (ref 8.7–10.5)
CHLORIDE SERPL-SCNC: 108 MMOL/L (ref 95–110)
CO2 SERPL-SCNC: 23 MMOL/L (ref 23–29)
CREAT SERPL-MCNC: 0.9 MG/DL (ref 0.5–1.4)
ERYTHROCYTE [DISTWIDTH] IN BLOOD BY AUTOMATED COUNT: 12.6 % (ref 11.5–14.5)
EST. GFR  (NO RACE VARIABLE): >60 ML/MIN/1.73 M^2
FOLATE SERPL-MCNC: 6.4 NG/ML (ref 4–24)
GLUCOSE SERPL-MCNC: 72 MG/DL (ref 70–110)
HCT VFR BLD AUTO: 36.3 % (ref 37–48.5)
HGB BLD-MCNC: 12.2 G/DL (ref 12–16)
MCH RBC QN AUTO: 33.8 PG (ref 27–31)
MCHC RBC AUTO-ENTMCNC: 33.6 G/DL (ref 32–36)
MCV RBC AUTO: 101 FL (ref 82–98)
PLATELET # BLD AUTO: 324 K/UL (ref 150–450)
PMV BLD AUTO: 9.8 FL (ref 9.2–12.9)
POTASSIUM SERPL-SCNC: 3.9 MMOL/L (ref 3.5–5.1)
PROT SERPL-MCNC: 7 G/DL (ref 6–8.4)
RBC # BLD AUTO: 3.61 M/UL (ref 4–5.4)
SODIUM SERPL-SCNC: 142 MMOL/L (ref 136–145)
TSH SERPL DL<=0.005 MIU/L-ACNC: 1.86 UIU/ML (ref 0.4–4)
VIT B12 SERPL-MCNC: 610 PG/ML (ref 210–950)
WBC # BLD AUTO: 4.7 K/UL (ref 3.9–12.7)

## 2022-08-09 PROCEDURE — 80053 COMPREHEN METABOLIC PANEL: CPT | Performed by: PSYCHIATRY & NEUROLOGY

## 2022-08-09 PROCEDURE — 82306 VITAMIN D 25 HYDROXY: CPT | Performed by: PSYCHIATRY & NEUROLOGY

## 2022-08-09 PROCEDURE — 82746 ASSAY OF FOLIC ACID SERUM: CPT | Performed by: PSYCHIATRY & NEUROLOGY

## 2022-08-09 PROCEDURE — 84443 ASSAY THYROID STIM HORMONE: CPT | Performed by: PSYCHIATRY & NEUROLOGY

## 2022-08-09 PROCEDURE — 82607 VITAMIN B-12: CPT | Performed by: PSYCHIATRY & NEUROLOGY

## 2022-08-09 PROCEDURE — 80175 DRUG SCREEN QUAN LAMOTRIGINE: CPT | Performed by: PSYCHIATRY & NEUROLOGY

## 2022-08-09 PROCEDURE — 85027 COMPLETE CBC AUTOMATED: CPT | Performed by: PSYCHIATRY & NEUROLOGY

## 2022-08-11 LAB — LAMOTRIGINE SERPL-MCNC: 2.6 UG/ML (ref 2–15)

## 2022-08-19 ENCOUNTER — PATIENT MESSAGE (OUTPATIENT)
Dept: PSYCHIATRY | Facility: CLINIC | Age: 42
End: 2022-08-19
Payer: COMMERCIAL

## 2022-08-22 ENCOUNTER — OFFICE VISIT (OUTPATIENT)
Dept: PSYCHIATRY | Facility: CLINIC | Age: 42
End: 2022-08-22
Payer: COMMERCIAL

## 2022-08-22 VITALS — BODY MASS INDEX: 17.42 KG/M2 | HEART RATE: 76 BPM | WEIGHT: 118 LBS

## 2022-08-22 DIAGNOSIS — F41.9 ANXIETY: ICD-10-CM

## 2022-08-22 DIAGNOSIS — E03.9 HYPOTHYROIDISM, UNSPECIFIED TYPE: Primary | ICD-10-CM

## 2022-08-22 DIAGNOSIS — F43.10 PTSD (POST-TRAUMATIC STRESS DISORDER): ICD-10-CM

## 2022-08-22 PROCEDURE — 99214 OFFICE O/P EST MOD 30 MIN: CPT | Mod: 95,,, | Performed by: PSYCHIATRY & NEUROLOGY

## 2022-08-22 PROCEDURE — 99214 PR OFFICE/OUTPT VISIT, EST, LEVL IV, 30-39 MIN: ICD-10-PCS | Mod: 95,,, | Performed by: PSYCHIATRY & NEUROLOGY

## 2022-08-22 RX ORDER — MIRTAZAPINE 15 MG/1
15 TABLET, FILM COATED ORAL NIGHTLY
Qty: 30 TABLET | Refills: 2 | Status: SHIPPED | OUTPATIENT
Start: 2022-08-22 | End: 2022-11-21

## 2022-08-22 NOTE — PATIENT INSTRUCTIONS
Repeat thryoid labs. Find endocrinologist.  Continue Lamictal 100mg in the evening.  Continue as needed Klonopin 0.5mg to 1mg.  Start remeron 15mg nightly.    Follow up in early November 2022.
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
## 2022-08-31 ENCOUNTER — PATIENT MESSAGE (OUTPATIENT)
Dept: OBSTETRICS AND GYNECOLOGY | Facility: CLINIC | Age: 42
End: 2022-08-31
Payer: COMMERCIAL

## 2022-08-31 DIAGNOSIS — E03.9 HYPOTHYROIDISM, UNSPECIFIED TYPE: Primary | ICD-10-CM

## 2022-08-31 RX ORDER — LEVOTHYROXINE SODIUM 75 UG/1
75 TABLET ORAL EVERY MORNING
Qty: 30 TABLET | Refills: 0 | Status: SHIPPED | OUTPATIENT
Start: 2022-08-31 | End: 2022-09-08 | Stop reason: SDUPTHER

## 2022-09-08 ENCOUNTER — OFFICE VISIT (OUTPATIENT)
Dept: ENDOCRINOLOGY | Facility: CLINIC | Age: 42
End: 2022-09-08
Payer: COMMERCIAL

## 2022-09-08 VITALS
SYSTOLIC BLOOD PRESSURE: 100 MMHG | OXYGEN SATURATION: 97 % | DIASTOLIC BLOOD PRESSURE: 61 MMHG | WEIGHT: 119.25 LBS | HEART RATE: 80 BPM | HEIGHT: 69 IN | BODY MASS INDEX: 17.66 KG/M2

## 2022-09-08 DIAGNOSIS — E03.9 HYPOTHYROIDISM, UNSPECIFIED TYPE: ICD-10-CM

## 2022-09-08 DIAGNOSIS — E03.9 HYPOTHYROIDISM (ACQUIRED): ICD-10-CM

## 2022-09-08 PROCEDURE — 99204 PR OFFICE/OUTPT VISIT, NEW, LEVL IV, 45-59 MIN: ICD-10-PCS | Mod: S$GLB,,, | Performed by: INTERNAL MEDICINE

## 2022-09-08 PROCEDURE — 99999 PR PBB SHADOW E&M-EST. PATIENT-LVL IV: CPT | Mod: PBBFAC,,, | Performed by: INTERNAL MEDICINE

## 2022-09-08 PROCEDURE — 99999 PR PBB SHADOW E&M-EST. PATIENT-LVL IV: ICD-10-PCS | Mod: PBBFAC,,, | Performed by: INTERNAL MEDICINE

## 2022-09-08 PROCEDURE — 99204 OFFICE O/P NEW MOD 45 MIN: CPT | Mod: S$GLB,,, | Performed by: INTERNAL MEDICINE

## 2022-09-08 RX ORDER — LEVOTHYROXINE SODIUM 75 UG/1
75 TABLET ORAL EVERY MORNING
Qty: 30 TABLET | Refills: 11 | Status: SHIPPED | OUTPATIENT
Start: 2022-09-08 | End: 2023-08-29

## 2022-09-08 NOTE — PROGRESS NOTES
Subjective:      Patient ID: Maria D Davis is a 42 y.o. female.    Chief Complaint:  Thyroid Nodule      History of Present Illness  Ms. Davis presents for evaluation and management of hypothyroidism.     Has active history of hypothyroidism and bipolar d/o.     Diagnosed with hypothyroidism at age 19. Had increases in dose during pregnancies. Her most recent dose has been stable at 75 mcg for the past year.     Family history of thyroid disease:  Mother with hypothyroidism  No thyroid nodules or thyroid cancer    Currently taking levothyroxine at 75 mcg once daily. However, she has been out of thyroid hormone for 5 days.     Takes thyroid hormone on an empty stomach and waits at least 1-2 hrs before eating or taking other meds. No missed doses    Denies weight changes, heat/cold intolerance, diarrhea/constipation, dry skin, excessive hair loss, palpitations, tremors or overt fatigue.    No overt fatigue. Always feels cold. No excessive hair loss.   Denies palpitations. Has intermittent hand tremor.     Has lost approx 10 lbs in the past 6 months. Works as a  and is very active. Has been trying to supplement more protein between meals.    Denies history of thyroid nodules.     No dysphagia, hoarseness or voice changes.     Most recent TSH:    Latest Reference Range & Units 08/09/22 09:36   TSH 0.400 - 4.000 uIU/mL 1.860       Review of Systems   Constitutional:  Negative for chills and fever.   Gastrointestinal:  Negative for nausea.   No CP  No SOB    Objective:   Physical Exam  Vitals and nursing note reviewed.   No thyromegaly or thyroid nodules palpated  Mild tremor of outstretched hands  No tachycardia    BP Readings from Last 3 Encounters:   09/08/22 100/61   01/25/22 100/60   11/01/21 120/68     Wt Readings from Last 1 Encounters:   09/08/22 1034 54.1 kg (119 lb 4.3 oz)       Body mass index is 17.61 kg/m².    Lab Review:   No results found for: HGBA1C  No results found for: CHOL, HDL, LDLCALC,  TRIG, CHOLHDL  Lab Results   Component Value Date     08/09/2022    K 3.9 08/09/2022     08/09/2022    CO2 23 08/09/2022    GLU 72 08/09/2022    BUN 10 08/09/2022    CREATININE 0.9 08/09/2022    CALCIUM 9.3 08/09/2022    PROT 7.0 08/09/2022    ALBUMIN 4.0 08/09/2022    BILITOT 0.6 08/09/2022    ALKPHOS 50 (L) 08/09/2022    AST 23 08/09/2022    ALT 17 08/09/2022    ANIONGAP 11 08/09/2022    TSH 1.860 08/09/2022         Assessment and Plan     Hypothyroidism (acquired)  --Patient with hypothyroidism due to Hashimoto's since age 19  --Clinically seems to be euthyroid and last TSH was mid normal on the current dose of thyroid hormone  --She does note a 10 lb weight loss in the past 6 months   --She works as a  and is very active, but she does not believe her caloric intake or activity have changed much  --I have recommended she continue to supplement more protein and calories  --It is unlikely the weight loss is thyroid related with a mid normal TSH  --Refills given for levothyroxine 75 mcg once daily  --Will repeat TSH in 5-6 weeks since she has been off of thyroid hormone the past 5 days  --Goal is for a normal TSH to avoid CV and bone complications      TSH and free T4 at [facility] in 5-6 weeks      Allan Giron M.D. Staff Endocrinology

## 2022-09-08 NOTE — ASSESSMENT & PLAN NOTE
--Patient with hypothyroidism due to Hashimoto's since age 19  --Clinically seems to be euthyroid and last TSH was mid normal on the current dose of thyroid hormone  --She does note a 10 lb weight loss in the past 6 months   --She works as a  and is very active, but she does not believe her caloric intake or activity have changed much  --I have recommended she continue to supplement more protein and calories  --It is unlikely the weight loss is thyroid related with a mid normal TSH  --Refills given for levothyroxine 75 mcg once daily  --Will repeat TSH in 5-6 weeks since she has been off of thyroid hormone the past 5 days  --Goal is for a normal TSH to avoid CV and bone complications

## 2022-12-15 ENCOUNTER — LAB VISIT (OUTPATIENT)
Dept: LAB | Facility: OTHER | Age: 42
End: 2022-12-15
Attending: INTERNAL MEDICINE
Payer: COMMERCIAL

## 2022-12-15 DIAGNOSIS — E03.9 HYPOTHYROIDISM, UNSPECIFIED TYPE: ICD-10-CM

## 2022-12-15 LAB
T4 FREE SERPL-MCNC: 0.99 NG/DL (ref 0.71–1.51)
TSH SERPL DL<=0.005 MIU/L-ACNC: 1.72 UIU/ML (ref 0.4–4)

## 2022-12-15 PROCEDURE — 36415 COLL VENOUS BLD VENIPUNCTURE: CPT | Performed by: INTERNAL MEDICINE

## 2022-12-15 PROCEDURE — 84443 ASSAY THYROID STIM HORMONE: CPT | Performed by: INTERNAL MEDICINE

## 2022-12-15 PROCEDURE — 84439 ASSAY OF FREE THYROXINE: CPT | Performed by: INTERNAL MEDICINE

## 2023-01-12 ENCOUNTER — HOSPITAL ENCOUNTER (EMERGENCY)
Facility: OTHER | Age: 43
Discharge: HOME | End: 2023-01-12
Attending: EMERGENCY MEDICINE
Payer: COMMERCIAL

## 2023-01-12 VITALS
HEART RATE: 62 BPM | RESPIRATION RATE: 18 BRPM | SYSTOLIC BLOOD PRESSURE: 102 MMHG | OXYGEN SATURATION: 99 % | DIASTOLIC BLOOD PRESSURE: 59 MMHG | BODY MASS INDEX: 17.72 KG/M2 | TEMPERATURE: 98 F | WEIGHT: 120 LBS

## 2023-01-12 DIAGNOSIS — M54.50 ACUTE LEFT-SIDED LOW BACK PAIN WITHOUT SCIATICA: Primary | ICD-10-CM

## 2023-01-12 LAB
B-HCG UR QL: NEGATIVE
CTP QC/QA: YES

## 2023-01-12 PROCEDURE — 99284 EMERGENCY DEPT VISIT MOD MDM: CPT | Mod: 25

## 2023-01-12 PROCEDURE — 81025 URINE PREGNANCY TEST: CPT | Performed by: EMERGENCY MEDICINE

## 2023-01-12 RX ORDER — DICLOFENAC SODIUM 10 MG/G
2 GEL TOPICAL 4 TIMES DAILY
Qty: 100 G | Refills: 0 | Status: SHIPPED | OUTPATIENT
Start: 2023-01-12 | End: 2023-03-09

## 2023-01-12 RX ORDER — LIDOCAINE 50 MG/G
1 PATCH TOPICAL DAILY
Qty: 15 PATCH | Refills: 0 | Status: SHIPPED | OUTPATIENT
Start: 2023-01-12 | End: 2023-05-31

## 2023-01-12 NOTE — Clinical Note
"Maria D Mayers" Susan was seen and treated in our emergency department on 1/12/2023.  She may return to work on 01/16/2023.       If you have any questions or concerns, please don't hesitate to call.      Sreedhar Boles MD"

## 2023-01-12 NOTE — DISCHARGE INSTRUCTIONS
Mrs. Davis,    Thank you for letting me care for you today! It was nice meeting you, and I hope you feel better soon.   If you would like access to your chart and what was done today please utilize the [facility].   Please come back to [facility] for all of your future medical needs.    Our goal in the emergency department is to always give you outstanding care and exceptional service. You may receive a survey by mail or e-mail in the next week regarding your experience in our ED. We would greatly appreciate you completing and returning the survey. Your feedback provides us with a way to recognize our staff who give very good care and it helps us learn how to improve when your experience was below our aspiration of excellence.     Sincerely,    Sreedhar Boles MD  Board Certified Emergency Physician
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
## 2023-01-12 NOTE — ED TRIAGE NOTES
41 yo female reports to ed with co L hip pain radiating to the mid-back. Reports this to be a  chronic issue, goes to physical therapy. Pain unrelieved with advil. Aaox4.

## 2023-01-17 ENCOUNTER — PATIENT MESSAGE (OUTPATIENT)
Dept: PSYCHIATRY | Facility: CLINIC | Age: 43
End: 2023-01-17
Payer: COMMERCIAL

## 2023-01-18 ENCOUNTER — TELEPHONE (OUTPATIENT)
Dept: SPINE | Facility: CLINIC | Age: 43
End: 2023-01-18
Payer: COMMERCIAL

## 2023-01-18 NOTE — TELEPHONE ENCOUNTER
This message is for patient in regards to his or hers appointment 1/19/23 at 8:00a with Rosa Santos Np.    On the 4th floor [address] in the [facility] and spine center. We do ask that patients arrive 15 minutes before appointment time.  Patient may contact [phone] if there is any questions or concerns. Thank you for entrusting in [facility] with your care.     Staff left voicemail

## 2023-01-19 ENCOUNTER — OFFICE VISIT (OUTPATIENT)
Dept: SPINE | Facility: CLINIC | Age: 43
End: 2023-01-19
Payer: COMMERCIAL

## 2023-01-19 VITALS
OXYGEN SATURATION: 100 % | TEMPERATURE: 97 F | SYSTOLIC BLOOD PRESSURE: 118 MMHG | BODY MASS INDEX: 17.72 KG/M2 | RESPIRATION RATE: 18 BRPM | DIASTOLIC BLOOD PRESSURE: 65 MMHG | WEIGHT: 120 LBS | HEART RATE: 56 BPM

## 2023-01-19 DIAGNOSIS — M79.18 MYOFASCIAL PAIN: ICD-10-CM

## 2023-01-19 DIAGNOSIS — M53.3 SACROILIAC JOINT DYSFUNCTION OF LEFT SIDE: ICD-10-CM

## 2023-01-19 DIAGNOSIS — S39.012A STRAIN OF LUMBAR REGION, INITIAL ENCOUNTER: Primary | ICD-10-CM

## 2023-01-19 PROCEDURE — 99204 OFFICE O/P NEW MOD 45 MIN: CPT | Mod: S$GLB,,, | Performed by: NURSE PRACTITIONER

## 2023-01-19 PROCEDURE — 99999 PR PBB SHADOW E&M-EST. PATIENT-LVL III: ICD-10-PCS | Mod: PBBFAC,,, | Performed by: NURSE PRACTITIONER

## 2023-01-19 PROCEDURE — 99204 PR OFFICE/OUTPT VISIT, NEW, LEVL IV, 45-59 MIN: ICD-10-PCS | Mod: S$GLB,,, | Performed by: NURSE PRACTITIONER

## 2023-01-19 PROCEDURE — 99999 PR PBB SHADOW E&M-EST. PATIENT-LVL III: CPT | Mod: PBBFAC,,, | Performed by: NURSE PRACTITIONER

## 2023-01-19 RX ORDER — METHYLPREDNISOLONE 4 MG/1
TABLET ORAL
Qty: 21 EACH | Refills: 0 | Status: SHIPPED | OUTPATIENT
Start: 2023-01-19 | End: 2023-02-09

## 2023-01-19 RX ORDER — TIZANIDINE 4 MG/1
2-4 TABLET ORAL 2 TIMES DAILY PRN
Qty: 60 TABLET | Refills: 1 | Status: SHIPPED | OUTPATIENT
Start: 2023-01-19 | End: 2023-01-20 | Stop reason: SDUPTHER
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
## 2023-01-20 ENCOUNTER — PATIENT MESSAGE (OUTPATIENT)
Dept: SPINE | Facility: CLINIC | Age: 43
End: 2023-01-20
Payer: COMMERCIAL

## 2023-01-20 DIAGNOSIS — M53.3 SACROILIAC JOINT DYSFUNCTION OF LEFT SIDE: ICD-10-CM

## 2023-01-20 DIAGNOSIS — M79.18 MYOFASCIAL PAIN: ICD-10-CM

## 2023-01-20 DIAGNOSIS — S39.012A STRAIN OF LUMBAR REGION, INITIAL ENCOUNTER: ICD-10-CM

## 2023-01-20 RX ORDER — TIZANIDINE 4 MG/1
2-4 TABLET ORAL 2 TIMES DAILY PRN
Qty: 10 TABLET | Refills: 0 | Status: SHIPPED | OUTPATIENT
Start: 2023-01-20

## 2023-03-02 ENCOUNTER — PATIENT MESSAGE (OUTPATIENT)
Dept: PSYCHIATRY | Facility: CLINIC | Age: 43
End: 2023-03-02
Payer: COMMERCIAL

## 2023-03-06 ENCOUNTER — OFFICE VISIT (OUTPATIENT)
Dept: PSYCHIATRY | Facility: CLINIC | Age: 43
End: 2023-03-06
Payer: COMMERCIAL

## 2023-03-06 DIAGNOSIS — F43.10 PTSD (POST-TRAUMATIC STRESS DISORDER): Primary | ICD-10-CM

## 2023-03-06 DIAGNOSIS — F41.9 ANXIETY: ICD-10-CM

## 2023-03-06 PROCEDURE — 99499 NO LOS: ICD-10-PCS | Mod: 95,,, | Performed by: PSYCHIATRY & NEUROLOGY

## 2023-03-06 PROCEDURE — 99499 UNLISTED E&M SERVICE: CPT | Mod: 95,,, | Performed by: PSYCHIATRY & NEUROLOGY

## 2023-03-08 NOTE — PROGRESS NOTES
"Subsequent Psychiatric Session-VIRTUAL    42 y.o. female    Reason for Follow Up:   Post-traumatic stress disorder, Anxiety    Current Medications:    Current Outpatient Medications:     LIDOcaine (LIDODERM) 5 %, Place 1 patch onto the skin once daily. Remove & Discard patch within 12 hours or as directed by MD, Disp: 15 patch, Rfl: 0    nitrofurantoin (MACRODANTIN) 100 MG capsule, Take 100 mg by mouth every 6 (six) hours., Disp: , Rfl:     tiZANidine (ZANAFLEX) 4 MG tablet, Take 0.5-1 tablets (2-4 mg total) by mouth 2 (two) times daily as needed (muscle pain)., Disp: 10 tablet, Rfl: 0    clonazePAM (KLONOPIN) 1 MG disintegrating tablet, Take 1 tablet (1 mg total) by mouth daily as needed (severe anxiety and panic)., Disp: 30 tablet, Rfl: 0    lamoTRIgine (LAMICTAL) 150 MG Tab, Take 1 tablet (150 mg total) by mouth once daily., Disp: 30 tablet, Rfl: 1    levothyroxine (SYNTHROID) 75 MCG tablet, Take 1 tablet (75 mcg total) by mouth every morning., Disp: 30 tablet, Rfl: 11     Date of Last Visit:   08/22/22    In Clinic Since:   December 2021  Therapy:    Not currently in therapy.       Interval History  She was doing really well for months, until January, and now anxiety and depression are back. She has had one instance of flashback and panic attack since then. She has not needed klonopin for months. She was feeling the best she has ever felt. Now, her energy starts to falter around noon. Her anxiety is peaking at 3-4pm in the hours before she starts work. She has "bad short term memory" and has been making a lot of mistakes at work. She is a , and she was working 50-60 hour weeks over the holidays. She had her thyroid checked in December and it was normal. She is under the care of an endocrinologist. She still wakes up every 1-2 hours. She tried the remeron a couple of times, but she does not want to take a sleep medication. Her weight is up from this summer. She has been trying to eat more meat. She " "denies SI, HI, AVH. No medication side effects, including rash.      Manic/Hypomanic Symptom Screening:  Since the last session, have you had any periods lasting at least a few days where your energy level was higher than normal and you did not need as much sleep at night to function the following day?: Denies    Substance Use Screening:  Not asked this session    Review of Measures  PHQ-9: 16  YADY-7: 14    Scores from last session:  PHQ-9: 12  YADY-7: 8    Scores from initial session:  PHQ-9: 17  YADY-7: 17    Review of PDMP  Reviewed. Last filled klonopin mg x 30 in August 2022    Constitutional     VITAL SIGNS  Temp 97.3 °F (36.3 °C)   /61    HR 88    RR      SpO2           No results found for this or any previous visit (from the past 672 hour(s)).       MENTAL STATUS EXAM  General:  Alert and oriented x 4 Appearance is good grooming and hygiene, appears stated age, BMI 18.3. Behavior: friendly and cooperative, eye contact normal.  Gait, Posture and Muscle Strength/Tone: Normal posture observed. No gross abnormal movements noted.  Psychomotor Agitation and Retardation: none evident  Speech: Normal rate, volume, articulation, and tone.  Mood: "anxiety and depression"  Affect: mildly anxious  Thought Process: Linear and coherent. No flight of ideas.  Associations:  Intact. No loosening of associations.  Thought content: Denies suicidal and homicidal thoughts, plans and intentions.  Perceptions: Denies any auditory or visual hallucinations. Does not appear internally preoccupied.  Orientation: Alert and oriented to person, place, date and situation  Attention and Concentration: Intact.   Memory: Intact grossly   Language: No deficits noted   Fund of Knowledge: appropriate for age and level and education.   Insight:   good  Judgment: good  Impulse control: good      ASSESSMENT  Problem List Items Addressed This Visit          Psychiatric    PTSD (post-traumatic stress disorder)    Relevant Medications    " clonazePAM (KLONOPIN) 1 MG disintegrating tablet    lamoTRIgine (LAMICTAL) 150 MG Tab    Anxiety    Relevant Medications    clonazePAM (KLONOPIN) 1 MG disintegrating tablet    lamoTRIgine (LAMICTAL) 150 MG Tab         PLAN  Patient Instructions   Increase lamictal from 100mg to 150mg in the evening.  Continue as needed Klonopin 0.5mg to 1mg.  Stop remeron.     Follow up in April/May            -------------------------------------------------------------------------------    Jud Saenz  [facility] - PSYCH[facility]ATRY    Due to the pandemic, today's session was performed over [facility]. Patient is confirmed to be in the [facility]. The participants are patient and myself.    Today's encounter took a total time of 30 minutes, and that time included patient interview and documentation and ordering labs and medication.    Risks, Benefits, Side Effects and Alternatives were discussed with the patient today and consent was obtained for the current medication regimen. The patient was encouraged to ask questions and these questions were answered and the patient was engaged in psychoeducation regarding diagnosis, course of illness, accuracy of the diagnosis, and other general elements regarding overall diagnosis and treatment consideration.     Any medications being used off-label were discussed with the patient inclusive of the evidence base for the use of the medications and consent was obtained for the off-label use of the medication.     Brief suicide risk assessment was performed with the patient today and safety planning was performed if indicated.    Note completed by: Jud Saenz MD, 3/9/2023 11:00 AM

## 2023-03-09 ENCOUNTER — OFFICE VISIT (OUTPATIENT)
Dept: PSYCHIATRY | Facility: CLINIC | Age: 43
End: 2023-03-09
Payer: COMMERCIAL

## 2023-03-09 VITALS
SYSTOLIC BLOOD PRESSURE: 113 MMHG | DIASTOLIC BLOOD PRESSURE: 61 MMHG | HEART RATE: 88 BPM | TEMPERATURE: 97 F | BODY MASS INDEX: 18.3 KG/M2 | WEIGHT: 123.88 LBS

## 2023-03-09 DIAGNOSIS — F43.10 PTSD (POST-TRAUMATIC STRESS DISORDER): ICD-10-CM

## 2023-03-09 DIAGNOSIS — F41.9 ANXIETY: ICD-10-CM

## 2023-03-09 PROCEDURE — 99999 PR PBB SHADOW E&M-EST. PATIENT-LVL III: CPT | Mod: PBBFAC,,, | Performed by: PSYCHIATRY & NEUROLOGY

## 2023-03-09 PROCEDURE — 99214 PR OFFICE/OUTPT VISIT, EST, LEVL IV, 30-39 MIN: ICD-10-PCS | Mod: S$GLB,,, | Performed by: PSYCHIATRY & NEUROLOGY

## 2023-03-09 PROCEDURE — 99214 OFFICE O/P EST MOD 30 MIN: CPT | Mod: S$GLB,,, | Performed by: PSYCHIATRY & NEUROLOGY

## 2023-03-09 PROCEDURE — 99999 PR PBB SHADOW E&M-EST. PATIENT-LVL III: ICD-10-PCS | Mod: PBBFAC,,, | Performed by: PSYCHIATRY & NEUROLOGY

## 2023-03-09 RX ORDER — LAMOTRIGINE 150 MG/1
150 TABLET ORAL DAILY
Qty: 30 TABLET | Refills: 1 | Status: SHIPPED | OUTPATIENT
Start: 2023-03-09 | End: 2023-04-04

## 2023-03-09 RX ORDER — CLONAZEPAM 1 MG/1
1 TABLET, ORALLY DISINTEGRATING ORAL DAILY PRN
Qty: 30 TABLET | Refills: 0 | Status: SHIPPED | OUTPATIENT
Start: 2023-03-09 | End: 2023-05-31 | Stop reason: SDUPTHER
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
## 2023-03-09 NOTE — PATIENT INSTRUCTIONS
Increase lamictal from 100mg to 150mg in the evening.  Continue as needed Klonopin 0.5mg to 1mg.  Stop remeron.     Follow up in April/May
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
## 2023-05-30 NOTE — PROGRESS NOTES
"Subsequent Psychiatric Session    43 y.o. female    Reason for Follow Up:   Post-traumatic stress disorder, Anxiety    Current Medications:    Current Outpatient Medications:     nitrofurantoin (MACRODANTIN) 100 MG capsule, Take 100 mg by mouth every 6 (six) hours., Disp: , Rfl:     tiZANidine (ZANAFLEX) 4 MG tablet, Take 0.5-1 tablets (2-4 mg total) by mouth 2 (two) times daily as needed (muscle pain)., Disp: 10 tablet, Rfl: 0    atomoxetine (STRATTERA) 25 MG capsule, Take 1 capsule (25 mg total) by mouth once daily., Disp: 30 capsule, Rfl: 0    clonazePAM (KLONOPIN) 1 MG disintegrating tablet, Take 1 tablet (1 mg total) by mouth daily as needed (severe anxiety and panic)., Disp: 30 tablet, Rfl: 0    lamoTRIgine (LAMICTAL) 150 MG Tab, Take 1 tablet (150 mg total) by mouth once daily., Disp: 90 tablet, Rfl: 4    levothyroxine (SYNTHROID) 75 MCG tablet, Take 1 tablet (75 mcg total) by mouth every morning., Disp: 30 tablet, Rfl: 11     Date of Last Visit:   03/09/23    In Clinic Since:   December 2021  Therapy:    Not currently in therapy.       Interval History  Patient "has been feeling good again." She is regulating her emotions better. She has been functioning well at work, but she still feels "scattered" and often has to do hours of work at home where she can focus better. She has had to take klonopin a couple of times over the past couple of weeks, because of stress caused by staff turnover for the summer. 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 They got more itchy after she increased the dose. They have been biopsied but the spots remain itchy sporadically. She has not had a rash. She denies SI, HI, AVH.     She tried wellbutrin in college. 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 Prazosin caused her to pass out in the past.    Manic/Hypomanic Symptom Screening:  Since the last session, have " "you had any periods lasting at least a few days where your energy level was higher than normal and you did not need as much sleep at night to function the following day?: Denies    Substance Use Screening:  Not asked this session    Review of Measures  PHQ-9: 14  YADY-7: 10    Scores from last session:  PHQ-9: 16  YADY-7: 14    PHQ-9: 12  YADY-7: 8    Scores from initial session:  PHQ-9: 17  YADY-7: 17    Review of PDMP  Reviewed. Last filled klonopin mg x 30 in August 2022    Constitutional     VITAL SIGNS  Temp 97.3 °F (36.3 °C)   BP (!) 116/58    HR 74    RR      SpO2           No results found for this or any previous visit (from the past 672 hour(s)).       MENTAL STATUS EXAM  General:  Alert and oriented x 4 Appearance is good grooming and hygiene, appears stated age, BMI 18.43. Behavior: friendly and cooperative, eye contact normal.  Gait, Posture and Muscle Strength/Tone: Normal posture and gait observed. No gross abnormal movements noted.  Psychomotor Agitation and Retardation: none evident  Speech: Normal rate, volume, articulation, and tone.  Mood: "good"  Affect: mildly anxious  Thought Process: Linear and coherent. No flight of ideas.  Associations:  Intact. No loosening of associations.  Thought content: Denies suicidal and homicidal thoughts, plans and intentions.  Perceptions: Denies any auditory or visual hallucinations. Does not appear internally preoccupied.  Orientation: Alert and oriented to person, place, date and situation  Attention and Concentration: Intact.   Memory: Intact grossly   Language: No deficits noted   Fund of Knowledge: appropriate for age and level and education.   Insight:   good  Judgment: good  Impulse control: good      ASSESSMENT  Problem List Items Addressed This Visit          Psychiatric    PTSD (post-traumatic stress disorder) - Primary    Relevant Medications    lamoTRIgine (LAMICTAL) 150 MG Tab    clonazePAM (KLONOPIN) 1 MG disintegrating tablet    atomoxetine " (STRATTERA) 25 MG capsule    Anxiety    Relevant Medications    lamoTRIgine (LAMICTAL) 150 MG Tab    clonazePAM (KLONOPIN) 1 MG disintegrating tablet           PLAN  Patient Instructions   Continue lamictal 150mg in the evening.  Continue as needed Klonopin 0.5mg to 1mg.  Get established with PCP Dr. Senior in October.     Follow up in June         -------------------------------------------------------------------------------    Jud Saenz  [facility] - PSYCHIATRY    Today's encounter took a total time of 30 minutes, and that time included patient interview and documentation and ordering labs and medication.    Risks, Benefits, Side Effects and Alternatives were discussed with the patient today and consent was obtained for the current medication regimen. The patient was encouraged to ask questions and these questions were answered and the patient was engaged in psychoeducation regarding diagnosis, course of illness, accuracy of the diagnosis, and other general elements regarding overall diagnosis and treatment consideration.     Any medications being used off-label were discussed with the patient inclusive of the evidence base for the use of the medications and consent was obtained for the off-label use of the medication.     Brief suicide risk assessment was performed with the patient today and safety planning was performed if indicated.    Note completed by: Jud Saenz MD, 5/31/2023 11:00 AM

## 2023-05-31 ENCOUNTER — OFFICE VISIT (OUTPATIENT)
Dept: PSYCHIATRY | Facility: CLINIC | Age: 43
End: 2023-05-31
Payer: COMMERCIAL

## 2023-05-31 VITALS
BODY MASS INDEX: 18.43 KG/M2 | DIASTOLIC BLOOD PRESSURE: 58 MMHG | SYSTOLIC BLOOD PRESSURE: 116 MMHG | WEIGHT: 124.75 LBS | HEART RATE: 74 BPM | TEMPERATURE: 97 F

## 2023-05-31 DIAGNOSIS — F41.9 ANXIETY: ICD-10-CM

## 2023-05-31 DIAGNOSIS — F43.10 PTSD (POST-TRAUMATIC STRESS DISORDER): Primary | ICD-10-CM

## 2023-05-31 PROCEDURE — 99999 PR PBB SHADOW E&M-EST. PATIENT-LVL III: CPT | Mod: PBBFAC,,, | Performed by: PSYCHIATRY & NEUROLOGY

## 2023-05-31 PROCEDURE — 99214 OFFICE O/P EST MOD 30 MIN: CPT | Mod: S$GLB,,, | Performed by: PSYCHIATRY & NEUROLOGY

## 2023-05-31 PROCEDURE — 99999 PR PBB SHADOW E&M-EST. PATIENT-LVL III: ICD-10-PCS | Mod: PBBFAC,,, | Performed by: PSYCHIATRY & NEUROLOGY

## 2023-05-31 PROCEDURE — 99214 PR OFFICE/OUTPT VISIT, EST, LEVL IV, 30-39 MIN: ICD-10-PCS | Mod: S$GLB,,, | Performed by: PSYCHIATRY & NEUROLOGY

## 2023-05-31 RX ORDER — LAMOTRIGINE 150 MG/1
150 TABLET ORAL DAILY
Qty: 90 TABLET | Refills: 4 | Status: SHIPPED | OUTPATIENT
Start: 2023-05-31

## 2023-05-31 RX ORDER — CLONAZEPAM 1 MG/1
1 TABLET, ORALLY DISINTEGRATING ORAL DAILY PRN
Qty: 30 TABLET | Refills: 0 | Status: SHIPPED | OUTPATIENT
Start: 2023-05-31 | End: 2023-06-21 | Stop reason: SDUPTHER

## 2023-05-31 RX ORDER — ATOMOXETINE 25 MG/1
25 CAPSULE ORAL DAILY
Qty: 30 CAPSULE | Refills: 0 | Status: SHIPPED | OUTPATIENT
Start: 2023-05-31 | End: 2023-06-21

## 2023-05-31 NOTE — PATIENT INSTRUCTIONS
Continue lamictal 150mg in the evening.  Continue as needed Klonopin 0.5mg to 1mg.  Get established with PCP Dr. Senior in October.     Follow up in June

## 2023-06-13 NOTE — PROGRESS NOTES
"Subsequent Psychiatric Session    43 y.o. female    Reason for Follow Up:   Post-traumatic stress disorder, Anxiety    Current Medications:    Current Outpatient Medications:     lamoTRIgine (LAMICTAL) 150 MG Tab, Take 1 tablet (150 mg total) by mouth once daily., Disp: 90 tablet, Rfl: 4    nitrofurantoin (MACRODANTIN) 100 MG capsule, Take 1 capsule (100 mg total) by mouth as needed (post coitus)., Disp: 28 capsule, Rfl: 2    tiZANidine (ZANAFLEX) 4 MG tablet, Take 0.5-1 tablets (2-4 mg total) by mouth 2 (two) times daily as needed (muscle pain)., Disp: 10 tablet, Rfl: 0    atomoxetine (STRATTERA) 40 MG capsule, Take 1 capsule (40 mg total) by mouth once daily., Disp: 30 capsule, Rfl: 11    [START ON 7/12/2023] clonazePAM (KLONOPIN) 1 MG disintegrating tablet, Take 1 tablet (1 mg total) by mouth daily as needed (severe anxiety and panic)., Disp: 30 tablet, Rfl: 0    levothyroxine (SYNTHROID) 75 MCG tablet, Take 1 tablet (75 mcg total) by mouth every morning., Disp: 30 tablet, Rfl: 11     Date of Last Visit:   05/31/23    In Clinic Since:   December 2021  Therapy:    Not currently in therapy.       Interval History  She has not seen any difference yet with strattera. She has had her usual amount of anxiety. There has been no return of the pseudolymphoma. She has taken half a klonopin twice since last session. She denies SI, HI, AVH.     She tried wellbutrin in college. 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 Prazosin caused her to pass out in the past.    Manic/Hypomanic Symptom Screening:  Since the last session, have you had any periods lasting at least a few days where your energy level was higher than normal and you did not need as much sleep at night to function the following day?: Denies    Substance Use Screening:  Alcohol: twice per week, 2 drinks  Marijuana: Denies  Other drugs: Denies    Review of Measures  PHQ-9: 9  YADY-7: " 7    Scores from last session:  PHQ-9: 14  YADY-7: 10    Scores from initial session:  PHQ-9: 17  YADY-7: 17    Review of PDMP  Reviewed. Last filled klonopin mg x 30 in August 2022    Constitutional     VITAL SIGNS  Temp 97.4 °F (36.3 °C)   BP (!) 109/58    HR 75    RR      SpO2           Recent Results (from the past 672 hour(s))   ANTIMULLERIAN HORMONE ([facility])    Collection Time: 06/21/23  9:11 AM   Result Value Ref Range    Anti Mullerian Hormone ([facility]) 1.1 0.03 - 5.5 ng/mL   FOLLICLE STIMULATING HORMONE    Collection Time: 06/21/23  9:11 AM   Result Value Ref Range    Follicle Stimulating Hormone 11.40 See Text mIU/mL   ESTRADIOL    Collection Time: 06/21/23  9:11 AM   Result Value Ref Range    Estradiol 91 See Text pg/mL          MENTAL STATUS EXAM  General:  Alert and oriented x 4 Appearance is good grooming and hygiene, appears stated age, BMI 18.52. Behavior: friendly and cooperative, eye contact normal.  Gait, Posture and Muscle Strength/Tone: Normal posture and gait observed. No gross abnormal movements noted.  Psychomotor Agitation and Retardation: none evident  Speech: Normal rate, volume, articulation, and tone.  Mood: unchanged  Affect: mildly anxious  Thought Process: Linear and coherent. No flight of ideas.  Associations:  Intact. No loosening of associations.  Thought content: Denies suicidal and homicidal thoughts, plans and intentions.  Perceptions: Denies any auditory or visual hallucinations. Does not appear internally preoccupied.  Orientation: Alert and oriented to person, place, date and situation  Attention and Concentration: Intact.   Memory: Intact grossly   Language: No deficits noted   Fund of Knowledge: appropriate for age and level and education.   Insight:   good  Judgment: good  Impulse control: good      ASSESSMENT  Problem List Items Addressed This Visit          Psychiatric    PTSD (post-traumatic stress disorder) - Primary    Relevant Medications    atomoxetine (STRATTERA) 40 MG  capsule    clonazePAM (KLONOPIN) 1 MG disintegrating tablet (Start on 7/12/2023)    Anxiety    Relevant Medications    clonazePAM (KLONOPIN) 1 MG disintegrating tablet (Start on 7/12/2023)       PLAN  Patient Instructions   Continue lamictal 150mg in the evening.  Continue as needed Klonopin 0.5mg to 1mg.  Get established with PCP Dr. Senior in October.     Follow up as needed.         -------------------------------------------------------------------------------    Jud Saenz  [facility] - PSYCHIATRY    Today's encounter took a total time of 30 minutes, and that time included patient interview and documentation and ordering labs and medication.    Risks, Benefits, Side Effects and Alternatives were discussed with the patient today and consent was obtained for the current medication regimen. The patient was encouraged to ask questions and these questions were answered and the patient was engaged in psychoeducation regarding diagnosis, course of illness, accuracy of the diagnosis, and other general elements regarding overall diagnosis and treatment consideration.     Any medications being used off-label were discussed with the patient inclusive of the evidence base for the use of the medications and consent was obtained for the off-label use of the medication.     Brief suicide risk assessment was performed with the patient today and safety planning was performed if indicated.    Note completed by: Jud Saenz MD, 6/22/2023 11:00 AM

## 2023-06-21 ENCOUNTER — OFFICE VISIT (OUTPATIENT)
Dept: OBSTETRICS AND GYNECOLOGY | Facility: CLINIC | Age: 43
End: 2023-06-21
Payer: COMMERCIAL

## 2023-06-21 ENCOUNTER — LAB VISIT (OUTPATIENT)
Dept: LAB | Facility: HOSPITAL | Age: 43
End: 2023-06-21
Attending: NURSE PRACTITIONER
Payer: COMMERCIAL

## 2023-06-21 ENCOUNTER — OFFICE VISIT (OUTPATIENT)
Dept: PSYCHIATRY | Facility: CLINIC | Age: 43
End: 2023-06-21
Payer: COMMERCIAL

## 2023-06-21 VITALS
TEMPERATURE: 97 F | WEIGHT: 125.44 LBS | BODY MASS INDEX: 18.52 KG/M2 | DIASTOLIC BLOOD PRESSURE: 58 MMHG | HEART RATE: 75 BPM | SYSTOLIC BLOOD PRESSURE: 109 MMHG

## 2023-06-21 VITALS
HEIGHT: 69 IN | SYSTOLIC BLOOD PRESSURE: 118 MMHG | WEIGHT: 124.69 LBS | DIASTOLIC BLOOD PRESSURE: 60 MMHG | BODY MASS INDEX: 18.47 KG/M2

## 2023-06-21 DIAGNOSIS — Z12.31 SCREENING MAMMOGRAM FOR BREAST CANCER: ICD-10-CM

## 2023-06-21 DIAGNOSIS — Z87.440 HISTORY OF RECURRENT UTIS: ICD-10-CM

## 2023-06-21 DIAGNOSIS — Z31.41 ENCOUNTER FOR FERTILITY TESTING: ICD-10-CM

## 2023-06-21 DIAGNOSIS — F43.10 PTSD (POST-TRAUMATIC STRESS DISORDER): Primary | ICD-10-CM

## 2023-06-21 DIAGNOSIS — Z01.419 WOMEN'S ANNUAL ROUTINE GYNECOLOGICAL EXAMINATION: Primary | ICD-10-CM

## 2023-06-21 DIAGNOSIS — F41.9 ANXIETY: ICD-10-CM

## 2023-06-21 LAB
ESTRADIOL SERPL-MCNC: 91 PG/ML
FSH SERPL-ACNC: 11.4 MIU/ML

## 2023-06-21 PROCEDURE — 99999 PR PBB SHADOW E&M-EST. PATIENT-LVL III: ICD-10-PCS | Mod: PBBFAC,,, | Performed by: PSYCHIATRY & NEUROLOGY

## 2023-06-21 PROCEDURE — 83001 ASSAY OF GONADOTROPIN (FSH): CPT | Performed by: NURSE PRACTITIONER

## 2023-06-21 PROCEDURE — 99999 PR PBB SHADOW E&M-EST. PATIENT-LVL III: ICD-10-PCS | Mod: PBBFAC,,, | Performed by: NURSE PRACTITIONER

## 2023-06-21 PROCEDURE — 99396 PR PREVENTIVE VISIT,EST,40-64: ICD-10-PCS | Mod: S$GLB,,, | Performed by: NURSE PRACTITIONER

## 2023-06-21 PROCEDURE — 99999 PR PBB SHADOW E&M-EST. PATIENT-LVL III: CPT | Mod: PBBFAC,,, | Performed by: PSYCHIATRY & NEUROLOGY

## 2023-06-21 PROCEDURE — 99214 OFFICE O/P EST MOD 30 MIN: CPT | Mod: S$GLB,,, | Performed by: PSYCHIATRY & NEUROLOGY

## 2023-06-21 PROCEDURE — 99999 PR PBB SHADOW E&M-EST. PATIENT-LVL III: CPT | Mod: PBBFAC,,, | Performed by: NURSE PRACTITIONER

## 2023-06-21 PROCEDURE — 83520 IMMUNOASSAY QUANT NOS NONAB: CPT | Performed by: NURSE PRACTITIONER

## 2023-06-21 PROCEDURE — 99214 PR OFFICE/OUTPT VISIT, EST, LEVL IV, 30-39 MIN: ICD-10-PCS | Mod: S$GLB,,, | Performed by: PSYCHIATRY & NEUROLOGY

## 2023-06-21 PROCEDURE — 99396 PREV VISIT EST AGE 40-64: CPT | Mod: S$GLB,,, | Performed by: NURSE PRACTITIONER

## 2023-06-21 PROCEDURE — 82670 ASSAY OF TOTAL ESTRADIOL: CPT | Performed by: NURSE PRACTITIONER

## 2023-06-21 RX ORDER — ATOMOXETINE 40 MG/1
40 CAPSULE ORAL DAILY
Qty: 30 CAPSULE | Refills: 11 | Status: SHIPPED | OUTPATIENT
Start: 2023-06-21 | End: 2023-06-29

## 2023-06-21 RX ORDER — NITROFURANTOIN (MACROCRYSTALS) 100 MG/1
100 CAPSULE ORAL
Qty: 28 CAPSULE | Refills: 2 | Status: SHIPPED | OUTPATIENT
Start: 2023-06-21 | End: 2023-12-12 | Stop reason: SDUPTHER

## 2023-06-21 RX ORDER — CLONAZEPAM 1 MG/1
1 TABLET, ORALLY DISINTEGRATING ORAL DAILY PRN
Qty: 30 TABLET | Refills: 0 | Status: SHIPPED | OUTPATIENT
Start: 2023-07-12
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
## 2023-06-21 NOTE — PATIENT INSTRUCTIONS
Continue lamictal 150mg in the evening.  Continue as needed Klonopin 0.5mg to 1mg.  Get established with PCP Dr. Senior in October.     Follow up as needed.

## 2023-06-22 ENCOUNTER — TELEPHONE (OUTPATIENT)
Dept: OBSTETRICS AND GYNECOLOGY | Facility: CLINIC | Age: 43
End: 2023-06-22
Payer: COMMERCIAL

## 2023-06-22 ENCOUNTER — PATIENT MESSAGE (OUTPATIENT)
Dept: OBSTETRICS AND GYNECOLOGY | Facility: CLINIC | Age: 43
End: 2023-06-22
Payer: COMMERCIAL

## 2023-06-22 LAB — MIS SERPL-MCNC: 1.1 NG/ML (ref 0.03–5.5)

## 2023-06-25 DIAGNOSIS — F43.10 PTSD (POST-TRAUMATIC STRESS DISORDER): ICD-10-CM

## 2023-06-26 ENCOUNTER — PATIENT MESSAGE (OUTPATIENT)
Dept: PSYCHIATRY | Facility: CLINIC | Age: 43
End: 2023-06-26
Payer: COMMERCIAL

## 2023-06-29 RX ORDER — ATOMOXETINE 40 MG/1
40 CAPSULE ORAL DAILY
Qty: 30 CAPSULE | Refills: 11 | Status: SHIPPED | OUTPATIENT
Start: 2023-06-29

## 2023-07-12 ENCOUNTER — HOSPITAL ENCOUNTER (OUTPATIENT)
Dept: RADIOLOGY | Facility: OTHER | Age: 43
Discharge: HOME | End: 2023-07-12
Attending: NURSE PRACTITIONER
Payer: COMMERCIAL

## 2023-07-12 DIAGNOSIS — Z12.31 SCREENING MAMMOGRAM FOR BREAST CANCER: ICD-10-CM

## 2023-07-12 PROCEDURE — 77067 MAMMO DIGITAL SCREENING BILAT WITH TOMO: ICD-10-PCS | Mod: 26,,, | Performed by: RADIOLOGY

## 2023-07-12 PROCEDURE — 77063 BREAST TOMOSYNTHESIS BI: CPT | Mod: 26,,, | Performed by: RADIOLOGY

## 2023-07-12 PROCEDURE — 77067 SCR MAMMO BI INCL CAD: CPT | Mod: TC

## 2023-07-12 PROCEDURE — 77067 SCR MAMMO BI INCL CAD: CPT | Mod: 26,,, | Performed by: RADIOLOGY

## 2023-07-12 PROCEDURE — 77063 MAMMO DIGITAL SCREENING BILAT WITH TOMO: ICD-10-PCS | Mod: 26,,, | Performed by: RADIOLOGY

## 2023-08-28 DIAGNOSIS — E03.9 HYPOTHYROIDISM, UNSPECIFIED TYPE: ICD-10-CM

## 2023-08-29 RX ORDER — LEVOTHYROXINE SODIUM 75 UG/1
75 TABLET ORAL
Qty: 90 TABLET | Refills: 3 | Status: SHIPPED | OUTPATIENT
Start: 2023-08-29

## 2023-09-04 ENCOUNTER — PATIENT MESSAGE (OUTPATIENT)
Dept: OBSTETRICS AND GYNECOLOGY | Facility: CLINIC | Age: 43
End: 2023-09-04
Payer: COMMERCIAL

## 2023-09-04 DIAGNOSIS — R39.89 SUSPECTED UTI: Primary | ICD-10-CM

## 2023-09-05 RX ORDER — NITROFURANTOIN 25; 75 MG/1; MG/1
100 CAPSULE ORAL 2 TIMES DAILY
Qty: 14 CAPSULE | Refills: 0 | Status: CANCELLED | OUTPATIENT
Start: 2023-09-05 | End: 2023-09-12

## 2023-09-18 ENCOUNTER — PATIENT OUTREACH (OUTPATIENT)
Dept: ADMINISTRATIVE | Facility: HOSPITAL | Age: 43
End: 2023-09-18
Payer: COMMERCIAL

## 2023-10-02 ENCOUNTER — LAB VISIT (OUTPATIENT)
Dept: LAB | Facility: OTHER | Age: 43
End: 2023-10-02
Attending: STUDENT IN AN ORGANIZED HEALTH CARE EDUCATION/TRAINING PROGRAM
Payer: COMMERCIAL

## 2023-10-02 ENCOUNTER — OFFICE VISIT (OUTPATIENT)
Dept: INTERNAL MEDICINE | Facility: CLINIC | Age: 43
End: 2023-10-02
Payer: COMMERCIAL

## 2023-10-02 VITALS
WEIGHT: 121.25 LBS | OXYGEN SATURATION: 100 % | SYSTOLIC BLOOD PRESSURE: 102 MMHG | BODY MASS INDEX: 17.91 KG/M2 | HEART RATE: 80 BPM | DIASTOLIC BLOOD PRESSURE: 74 MMHG

## 2023-10-02 DIAGNOSIS — Z11.4 SCREENING FOR HIV WITHOUT PRESENCE OF RISK FACTORS: ICD-10-CM

## 2023-10-02 DIAGNOSIS — Z13.6 SCREENING FOR CARDIOVASCULAR CONDITION: ICD-10-CM

## 2023-10-02 DIAGNOSIS — Z23 NEED FOR VACCINATION: ICD-10-CM

## 2023-10-02 DIAGNOSIS — Z13.1 SCREENING FOR DIABETES MELLITUS: ICD-10-CM

## 2023-10-02 DIAGNOSIS — M79.18 MYOFASCIAL PAIN: ICD-10-CM

## 2023-10-02 DIAGNOSIS — Z00.00 HEALTH MAINTENANCE EXAMINATION: Primary | ICD-10-CM

## 2023-10-02 DIAGNOSIS — M25.521 RIGHT ELBOW PAIN: ICD-10-CM

## 2023-10-02 DIAGNOSIS — F41.9 ANXIETY: ICD-10-CM

## 2023-10-02 DIAGNOSIS — E03.9 HYPOTHYROIDISM (ACQUIRED): ICD-10-CM

## 2023-10-02 DIAGNOSIS — Z11.59 ENCOUNTER FOR HEPATITIS C SCREENING TEST FOR LOW RISK PATIENT: ICD-10-CM

## 2023-10-02 DIAGNOSIS — F43.10 PTSD (POST-TRAUMATIC STRESS DISORDER): ICD-10-CM

## 2023-10-02 DIAGNOSIS — E55.9 VITAMIN D DEFICIENCY: ICD-10-CM

## 2023-10-02 DIAGNOSIS — Z00.00 HEALTH MAINTENANCE EXAMINATION: ICD-10-CM

## 2023-10-02 LAB
25(OH)D3+25(OH)D2 SERPL-MCNC: 26 NG/ML (ref 30–96)
ALBUMIN SERPL BCP-MCNC: 4.5 G/DL (ref 3.5–5.2)
ALP SERPL-CCNC: 58 U/L (ref 55–135)
ALT SERPL W/O P-5'-P-CCNC: 19 U/L (ref 10–44)
ANION GAP SERPL CALC-SCNC: 9 MMOL/L (ref 8–16)
AST SERPL-CCNC: 23 U/L (ref 10–40)
BASOPHILS # BLD AUTO: 0.1 K/UL (ref 0–0.2)
BASOPHILS NFR BLD: 2.1 % (ref 0–1.9)
BILIRUB SERPL-MCNC: 0.5 MG/DL (ref 0.1–1)
BUN SERPL-MCNC: 7 MG/DL (ref 6–20)
CALCIUM SERPL-MCNC: 9.4 MG/DL (ref 8.7–10.5)
CHLORIDE SERPL-SCNC: 106 MMOL/L (ref 95–110)
CHOLEST SERPL-MCNC: 188 MG/DL (ref 120–199)
CHOLEST/HDLC SERPL: 2.4 {RATIO} (ref 2–5)
CO2 SERPL-SCNC: 24 MMOL/L (ref 23–29)
CREAT SERPL-MCNC: 0.9 MG/DL (ref 0.5–1.4)
DIFFERENTIAL METHOD: ABNORMAL
EOSINOPHIL # BLD AUTO: 0.4 K/UL (ref 0–0.5)
EOSINOPHIL NFR BLD: 7.4 % (ref 0–8)
ERYTHROCYTE [DISTWIDTH] IN BLOOD BY AUTOMATED COUNT: 13.3 % (ref 11.5–14.5)
EST. GFR  (NO RACE VARIABLE): >60 ML/MIN/1.73 M^2
ESTIMATED AVG GLUCOSE: 94 MG/DL (ref 68–131)
GLUCOSE SERPL-MCNC: 80 MG/DL (ref 70–110)
HBA1C MFR BLD: 4.9 % (ref 4–5.6)
HCT VFR BLD AUTO: 40.1 % (ref 37–48.5)
HCV AB SERPL QL IA: NORMAL
HDLC SERPL-MCNC: 79 MG/DL (ref 40–75)
HDLC SERPL: 42 % (ref 20–50)
HGB BLD-MCNC: 13.4 G/DL (ref 12–16)
HIV 1+2 AB+HIV1 P24 AG SERPL QL IA: NORMAL
IMM GRANULOCYTES # BLD AUTO: 0.01 K/UL (ref 0–0.04)
IMM GRANULOCYTES NFR BLD AUTO: 0.2 % (ref 0–0.5)
LDLC SERPL CALC-MCNC: 97.2 MG/DL (ref 63–159)
LYMPHOCYTES # BLD AUTO: 1.6 K/UL (ref 1–4.8)
LYMPHOCYTES NFR BLD: 34.3 % (ref 18–48)
MCH RBC QN AUTO: 33.5 PG (ref 27–31)
MCHC RBC AUTO-ENTMCNC: 33.4 G/DL (ref 32–36)
MCV RBC AUTO: 100 FL (ref 82–98)
MONOCYTES # BLD AUTO: 0.7 K/UL (ref 0.3–1)
MONOCYTES NFR BLD: 14.7 % (ref 4–15)
NEUTROPHILS # BLD AUTO: 1.9 K/UL (ref 1.8–7.7)
NEUTROPHILS NFR BLD: 41.3 % (ref 38–73)
NONHDLC SERPL-MCNC: 109 MG/DL
NRBC BLD-RTO: 0 /100 WBC
PLATELET # BLD AUTO: 355 K/UL (ref 150–450)
PMV BLD AUTO: 9.6 FL (ref 9.2–12.9)
POTASSIUM SERPL-SCNC: 4 MMOL/L (ref 3.5–5.1)
PROT SERPL-MCNC: 7.4 G/DL (ref 6–8.4)
RBC # BLD AUTO: 4 M/UL (ref 4–5.4)
SODIUM SERPL-SCNC: 139 MMOL/L (ref 136–145)
TRIGL SERPL-MCNC: 59 MG/DL (ref 30–150)
TSH SERPL DL<=0.005 MIU/L-ACNC: 2.86 UIU/ML (ref 0.4–4)
WBC # BLD AUTO: 4.7 K/UL (ref 3.9–12.7)

## 2023-10-02 PROCEDURE — 99386 PREV VISIT NEW AGE 40-64: CPT | Mod: 25,S$GLB,, | Performed by: STUDENT IN AN ORGANIZED HEALTH CARE EDUCATION/TRAINING PROGRAM

## 2023-10-02 PROCEDURE — 82306 VITAMIN D 25 HYDROXY: CPT | Performed by: STUDENT IN AN ORGANIZED HEALTH CARE EDUCATION/TRAINING PROGRAM

## 2023-10-02 PROCEDURE — 86803 HEPATITIS C AB TEST: CPT | Performed by: STUDENT IN AN ORGANIZED HEALTH CARE EDUCATION/TRAINING PROGRAM

## 2023-10-02 PROCEDURE — 99203 PR OFFICE/OUTPT VISIT, NEW, LEVL III, 30-44 MIN: ICD-10-PCS | Mod: 25,S$GLB,, | Performed by: STUDENT IN AN ORGANIZED HEALTH CARE EDUCATION/TRAINING PROGRAM

## 2023-10-02 PROCEDURE — 87389 HIV-1 AG W/HIV-1&-2 AB AG IA: CPT | Performed by: STUDENT IN AN ORGANIZED HEALTH CARE EDUCATION/TRAINING PROGRAM

## 2023-10-02 PROCEDURE — 99999 PR PBB SHADOW E&M-EST. PATIENT-LVL III: CPT | Mod: PBBFAC,,, | Performed by: STUDENT IN AN ORGANIZED HEALTH CARE EDUCATION/TRAINING PROGRAM

## 2023-10-02 PROCEDURE — 84443 ASSAY THYROID STIM HORMONE: CPT | Performed by: STUDENT IN AN ORGANIZED HEALTH CARE EDUCATION/TRAINING PROGRAM

## 2023-10-02 PROCEDURE — 99386 PR PREVENTIVE VISIT,NEW,40-64: ICD-10-PCS | Mod: 25,S$GLB,, | Performed by: STUDENT IN AN ORGANIZED HEALTH CARE EDUCATION/TRAINING PROGRAM

## 2023-10-02 PROCEDURE — 80061 LIPID PANEL: CPT | Performed by: STUDENT IN AN ORGANIZED HEALTH CARE EDUCATION/TRAINING PROGRAM

## 2023-10-02 PROCEDURE — 85025 COMPLETE CBC W/AUTO DIFF WBC: CPT | Performed by: STUDENT IN AN ORGANIZED HEALTH CARE EDUCATION/TRAINING PROGRAM

## 2023-10-02 PROCEDURE — 83036 HEMOGLOBIN GLYCOSYLATED A1C: CPT | Performed by: STUDENT IN AN ORGANIZED HEALTH CARE EDUCATION/TRAINING PROGRAM

## 2023-10-02 PROCEDURE — 99203 OFFICE O/P NEW LOW 30 MIN: CPT | Mod: 25,S$GLB,, | Performed by: STUDENT IN AN ORGANIZED HEALTH CARE EDUCATION/TRAINING PROGRAM

## 2023-10-02 PROCEDURE — 80053 COMPREHEN METABOLIC PANEL: CPT | Performed by: STUDENT IN AN ORGANIZED HEALTH CARE EDUCATION/TRAINING PROGRAM

## 2023-10-02 PROCEDURE — 99999 PR PBB SHADOW E&M-EST. PATIENT-LVL III: ICD-10-PCS | Mod: PBBFAC,,, | Performed by: STUDENT IN AN ORGANIZED HEALTH CARE EDUCATION/TRAINING PROGRAM

## 2023-10-02 PROCEDURE — 36415 COLL VENOUS BLD VENIPUNCTURE: CPT | Performed by: STUDENT IN AN ORGANIZED HEALTH CARE EDUCATION/TRAINING PROGRAM

## 2023-10-02 RX ORDER — TIZANIDINE 4 MG/1
2-4 TABLET ORAL 2 TIMES DAILY PRN
Qty: 10 TABLET | Refills: 0 | Status: CANCELLED | OUTPATIENT
Start: 2023-10-02

## 2023-10-02 RX ORDER — DICLOFENAC SODIUM 10 MG/G
2 GEL TOPICAL
Qty: 100 G | Refills: 1 | Status: SHIPPED | OUTPATIENT
Start: 2023-10-02

## 2023-10-02 RX ORDER — MULTIVITAMIN
1 TABLET ORAL DAILY
COMMUNITY
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
## 2023-10-09 ENCOUNTER — HOSPITAL ENCOUNTER (OUTPATIENT)
Dept: RADIOLOGY | Facility: OTHER | Age: 43
Discharge: HOME | End: 2023-10-09
Attending: STUDENT IN AN ORGANIZED HEALTH CARE EDUCATION/TRAINING PROGRAM
Payer: COMMERCIAL

## 2023-10-09 DIAGNOSIS — M25.521 RIGHT ELBOW PAIN: ICD-10-CM

## 2023-10-09 PROCEDURE — 73080 X-RAY EXAM OF ELBOW: CPT | Mod: TC,FY,RT

## 2023-10-09 PROCEDURE — 73080 XR ELBOW COMPLETE 3 VIEW RIGHT: ICD-10-PCS | Mod: 26,RT,, | Performed by: INTERNAL MEDICINE

## 2023-10-09 PROCEDURE — 73080 X-RAY EXAM OF ELBOW: CPT | Mod: 26,RT,, | Performed by: INTERNAL MEDICINE

## 2023-11-20 ENCOUNTER — OFFICE VISIT (OUTPATIENT)
Dept: ORTHOPEDICS | Facility: CLINIC | Age: 43
End: 2023-11-20
Payer: COMMERCIAL

## 2023-11-20 DIAGNOSIS — M77.11 RIGHT LATERAL EPICONDYLITIS: Primary | ICD-10-CM

## 2023-11-20 PROCEDURE — 99203 OFFICE O/P NEW LOW 30 MIN: CPT | Mod: S$GLB,,, | Performed by: PHYSICIAN ASSISTANT

## 2023-11-20 PROCEDURE — 99999 PR PBB SHADOW E&M-EST. PATIENT-LVL III: ICD-10-PCS | Mod: PBBFAC,,, | Performed by: PHYSICIAN ASSISTANT

## 2023-11-20 PROCEDURE — 99999 PR PBB SHADOW E&M-EST. PATIENT-LVL III: CPT | Mod: PBBFAC,,, | Performed by: PHYSICIAN ASSISTANT

## 2023-11-20 PROCEDURE — 99203 PR OFFICE/OUTPT VISIT, NEW, LEVL III, 30-44 MIN: ICD-10-PCS | Mod: S$GLB,,, | Performed by: PHYSICIAN ASSISTANT

## 2023-11-20 RX ORDER — MELOXICAM 15 MG/1
15 TABLET ORAL DAILY
Qty: 30 TABLET | Refills: 0 | Status: SHIPPED | OUTPATIENT
Start: 2023-11-20 | End: 2023-12-26

## 2023-11-20 RX ORDER — METHYLPREDNISOLONE 4 MG/1
TABLET ORAL
Qty: 21 EACH | Refills: 0 | Status: SHIPPED | OUTPATIENT
Start: 2023-11-20 | End: 2023-12-11

## 2023-11-20 NOTE — PROGRESS NOTES
[facility]  History & Physical  Orthopedics    SUBJECTIVE:      Chief Complaint: Right elbow pain    Referring Provider: Russo-Digeorge, Jamie L* Dr. Dunbar is the supervising physician for this encounter/patient    History of Present Illness:  Patient is a 43 y.o. right hand dominant female who presents today with complaints of right elbow pain, progressive for the past month. 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 She says she has had tennis elbow before, and this is more progressive. She has tried some Advil without relief.     The patient is a/an .    Onset of symptoms/DOI was months.    Symptoms are aggravated by activity and movement.    Symptoms are alleviated by rest.    Symptoms consist of pain.    The patient rates their pain as a 8/10.    Attempted treatment(s) and/or interventions include activity modifications, rest, anti-inflammatory medications.     The patient denies any fevers, chills, N/V, D/C and presents for evaluation.       Past Medical History:   Diagnosis Date    Thyroid disease      Past Surgical History:   Procedure Laterality Date    COLON SURGERY  2015    Resection after injury    COLPOSCOPY      FRACTURE SURGERY  07/23/2016    jeb in right femur, plate left ulna    LAPAROSCOPIC SPLENECTOMY      LOOP ELECTROSURGICAL EXCISION PROCEDURE (LEEP)       Review of patient's allergies indicates:  No Known Allergies  Social History     Social History Narrative    Not on file     Family History   Problem Relation Age of Onset    Ovarian cancer Mother     Alcohol abuse Mother     Depression Mother     Drug abuse Mother     Hypertension Mother     Skin cancer Mother     Stroke Mother          Current Outpatient Medications:     atomoxetine (STRATTERA) 40 MG capsule, Take 1 capsule (40 mg total) by mouth once daily., Disp: 30 capsule, Rfl: 11    clonazePAM (KLONOPIN) 1 MG disintegrating tablet, Take 1  tablet (1 mg total) by mouth daily as needed (severe anxiety and panic)., Disp: 30 tablet, Rfl: 0    diclofenac sodium (VOLTAREN) 1 % Gel, Apply 2 g topically after meals as needed (elbow pain)., Disp: 100 g, Rfl: 1    lamoTRIgine (LAMICTAL) 150 MG Tab, Take 1 tablet (150 mg total) by mouth once daily., Disp: 90 tablet, Rfl: 4    levothyroxine (SYNTHROID) 75 MCG tablet, TAKE 1 TABLET BY MOUTH EVERY DAY IN THE MORNING, Disp: 90 tablet, Rfl: 3    meloxicam (MOBIC) 15 MG tablet, Take 1 tablet (15 mg total) by mouth once daily., Disp: 30 tablet, Rfl: 0    methylPREDNISolone (MEDROL DOSEPACK) 4 mg tablet, use as directed, Disp: 21 each, Rfl: 0    multivitamin (THERAGRAN) per tablet, Take 1 tablet by mouth once daily., Disp: , Rfl:     tiZANidine (ZANAFLEX) 4 MG tablet, Take 0.5-1 tablets (2-4 mg total) by mouth 2 (two) times daily as needed (muscle pain)., Disp: 10 tablet, Rfl: 0      Review of Systems:  Constitutional: no fever or chills  Eyes: no visual changes  ENT: no nasal congestion or sore throat  Respiratory: no cough or shortness of breath  Cardiovascular: no chest pain  Gastrointestinal: no nausea or vomiting, tolerating diet  Musculoskeletal: pain and soreness    OBJECTIVE:      Vital Signs (Most Recent):  There were no vitals filed for this visit.  There is no height or weight on file to calculate BMI.      Physical Exam:  Constitutional: The patient appears well-developed and well-nourished. No distress.   Skin: No lesions appreciated  Head: Normocephalic and atraumatic.   Nose: Nose normal.   Ears: No deformities seen  Eyes: Conjunctivae and EOM are normal.   Neck: No tracheal deviation present.   Cardiovascular: Normal rate and intact distal pulses.    Pulmonary/Chest: Effort normal. No respiratory distress.   Abdominal: There is no guarding.   Neurological: The patient is alert.   Psychiatric: The patient has a normal mood and affect.     Right Elbow/Hand/Wrist  Examination:    Observation/Inspection:  Swelling  none    Deformity  none  Discoloration  none     Scars   none    Atrophy  none    HAND/WRIST EXAMINATION:  Finkelstein's Test   Neg  WHAT Test    Neg  Snuff box tenderness   Neg  Boucher's Test    Neg  Hook of Hamate Tenderness  Neg  CMC grind    Neg  Circumduction test   Neg  Acutely TTP over the lateral epicondyle    Neurovascular Exam:  Digits WWP, brisk CR < 3s throughout  NVI motor/LTS to M/R/U nerves, radial pulse 2+  Tinel's Test - Carpal Tunnel  Neg  Tinel's Test - Cubital Tunnel  Neg  Phalen's Test    Neg  Median Nerve Compression Test Neg    ROM hand full, painless    ROM wrist full, painless    ROM elbow full, pain to lateral elbow with full extension. Pain to lateral elbow with resisted wrist extension and resisted supination.    Abdomen not guarded  Respirations nonlabored  Perfusion intact    Diagnostic Results:     Imaging - I independently viewed the patient's imaging as well as the radiology report.      FINDINGS:  No bone, joint, or soft tissue abnormality is seen.     Impression:     Unremarkable examination.      ASSESSMENT/PLAN:      43 y.o. yo female with Right elbow lateral epicondylitis    Plan: The patient and I had a thorough discussion today.  We discussed the working diagnosis as well as several other potential alternative diagnoses.  Treatment options were discussed, both conservative and surgical.  Conservative treatment options would include things such as activity modifications, workplace modifications, a period of rest, oral vs topical OTC and prescription anti-inflammatory medications, occupational therapy, splinting/bracing, immobilization, corticosteroid injections, and others.  Surgical options were discussed as well.     At this time, the patient would like to proceed with a trial of OT for rehab, Medrol dosepack and Mobic ordered. We discuss holding on Mobic until medrol is completed. Ice massage discussed along with wrist  bracing to help prevent repetitive wrist extension. RTC on prn basis.    Should the patient's symptoms worsen, persist, or fail to improve they should return for reevaluation and I would be happy to see them back anytime.           Please do not hesitate to reach out to us via email, phone, or MyChart with any questions, concerns, or feedback.

## 2023-12-10 DIAGNOSIS — Z87.440 HISTORY OF RECURRENT UTIS: ICD-10-CM

## 2023-12-12 RX ORDER — NITROFURANTOIN (MACROCRYSTALS) 100 MG/1
100 CAPSULE ORAL
Qty: 28 CAPSULE | Refills: 2 | Status: SHIPPED | OUTPATIENT
Start: 2023-12-12 | End: 2024-01-09

## 2023-12-18 ENCOUNTER — TELEPHONE (OUTPATIENT)
Dept: INTERNAL MEDICINE | Facility: CLINIC | Age: 43
End: 2023-12-18
Payer: COMMERCIAL

## 2023-12-26 RX ORDER — MELOXICAM 15 MG/1
15 TABLET ORAL
Qty: 30 TABLET | Refills: 0 | Status: SHIPPED | OUTPATIENT
Start: 2023-12-26 | End: 2024-02-19

## 2024-02-19 RX ORDER — MELOXICAM 15 MG/1
15 TABLET ORAL
Qty: 30 TABLET | Refills: 0 | Status: SHIPPED | OUTPATIENT
Start: 2024-02-19 | End: 2024-03-26

## 2024-03-17 ENCOUNTER — PATIENT MESSAGE (OUTPATIENT)
Dept: OBSTETRICS AND GYNECOLOGY | Facility: CLINIC | Age: 44
End: 2024-03-17
Payer: COMMERCIAL

## 2024-03-18 RX ORDER — NITROFURANTOIN (MACROCRYSTALS) 100 MG/1
CAPSULE ORAL
COMMUNITY
Start: 2024-02-29

## 2024-03-18 RX ORDER — GABAPENTIN 300 MG/1
300 CAPSULE ORAL NIGHTLY
COMMUNITY
Start: 2024-02-27

## 2024-03-18 RX ORDER — TRIAMCINOLONE ACETONIDE 1 MG/G
CREAM TOPICAL
COMMUNITY
Start: 2023-10-05

## 2024-03-22 DIAGNOSIS — N39.0 RECURRENT UTI: Primary | ICD-10-CM

## 2024-03-22 RX ORDER — NITROFURANTOIN MACROCRYSTALS 50 MG/1
50 CAPSULE ORAL
Qty: 30 CAPSULE | Refills: 2 | Status: SHIPPED | OUTPATIENT
Start: 2024-03-22 | End: 2025-03-22

## 2024-03-22 RX ORDER — NITROFURANTOIN (MACROCRYSTALS) 100 MG/1
CAPSULE ORAL
Qty: 36 CAPSULE | Refills: 0 | OUTPATIENT
Start: 2024-03-22

## 2024-03-26 RX ORDER — MELOXICAM 15 MG/1
15 TABLET ORAL
Qty: 30 TABLET | Refills: 0 | Status: SHIPPED | OUTPATIENT
Start: 2024-03-26 | End: 2024-04-25

## 2024-04-25 RX ORDER — MELOXICAM 15 MG/1
15 TABLET ORAL
Qty: 30 TABLET | Refills: 0 | Status: SHIPPED | OUTPATIENT
Start: 2024-04-25 | End: 2024-05-21

## 2024-05-21 RX ORDER — MELOXICAM 15 MG/1
15 TABLET ORAL
Qty: 30 TABLET | Refills: 0 | Status: SHIPPED | OUTPATIENT
Start: 2024-05-21

## 2024-07-08 ENCOUNTER — TELEPHONE (OUTPATIENT)
Dept: INTERNAL MEDICINE | Facility: CLINIC | Age: 44
End: 2024-07-08
Payer: COMMERCIAL

## 2024-07-08 ENCOUNTER — PATIENT MESSAGE (OUTPATIENT)
Dept: INTERNAL MEDICINE | Facility: CLINIC | Age: 44
End: 2024-07-08
Payer: COMMERCIAL

## 2024-07-08 DIAGNOSIS — F41.9 ANXIETY: ICD-10-CM

## 2024-07-08 DIAGNOSIS — F43.10 PTSD (POST-TRAUMATIC STRESS DISORDER): ICD-10-CM

## 2024-07-08 RX ORDER — LAMOTRIGINE 150 MG/1
150 TABLET ORAL DAILY
Qty: 90 TABLET | Refills: 0 | Status: SHIPPED | OUTPATIENT
Start: 2024-07-08

## 2024-07-08 NOTE — TELEPHONE ENCOUNTER
----- Message from Nery Hartley sent at 7/8/2024  3:07 PM CDT -----  Regarding: lamoTRIgine (LAMICTAL) 150 MG Tab  Type:  Patient Returning Call      Name of who is calling:        What is request in detail:patient is requesting a call back. Patient can't get to she her psychiatrist until later this month and wants to know if Dr Hahn can fill her prescription for lamoTRIgine (LAMICTAL) 150 MG Tab        Can clinic reply by [facility]:no        What number to call back if not in [facility]: [phone]

## 2024-07-08 NOTE — TELEPHONE ENCOUNTER
----- Message from Nery Hartley sent at 7/8/2024  3:07 PM CDT -----  Regarding: lamoTRIgine (LAMICTAL) 150 MG Tab  Type:  Patient Returning Call      Name of who is calling:        What is request in detail:patient is requesting a call back. Patient can't get to she her psychiatrist until later this month and wants to know if Dr Hahn can fill her prescription for lamoTRIgine (LAMICTAL) 150 MG Tab        Can clinic reply by [facility]:no        What number to call back if not in [facility]: [phone]

## 2024-08-05 ENCOUNTER — PATIENT OUTREACH (OUTPATIENT)
Dept: ADMINISTRATIVE | Facility: HOSPITAL | Age: 44
End: 2024-08-05
Payer: COMMERCIAL

## 2024-09-13 ENCOUNTER — LAB VISIT (OUTPATIENT)
Dept: LAB | Facility: OTHER | Age: 44
End: 2024-09-13
Payer: COMMERCIAL

## 2024-09-13 ENCOUNTER — OFFICE VISIT (OUTPATIENT)
Dept: OBSTETRICS AND GYNECOLOGY | Facility: CLINIC | Age: 44
End: 2024-09-13
Payer: COMMERCIAL

## 2024-09-13 VITALS — SYSTOLIC BLOOD PRESSURE: 100 MMHG | BODY MASS INDEX: 18.31 KG/M2 | WEIGHT: 124 LBS | DIASTOLIC BLOOD PRESSURE: 70 MMHG

## 2024-09-13 DIAGNOSIS — N93.0 POSTCOITAL BLEEDING: ICD-10-CM

## 2024-09-13 DIAGNOSIS — Z11.3 SCREENING FOR STD (SEXUALLY TRANSMITTED DISEASE): ICD-10-CM

## 2024-09-13 DIAGNOSIS — N89.8 VAGINAL DISCHARGE: ICD-10-CM

## 2024-09-13 DIAGNOSIS — Z01.419 ENCOUNTER FOR WELL WOMAN EXAM WITH ROUTINE GYNECOLOGICAL EXAM: Primary | ICD-10-CM

## 2024-09-13 DIAGNOSIS — Z12.31 SCREENING MAMMOGRAM FOR BREAST CANCER: ICD-10-CM

## 2024-09-13 LAB
HIV 1+2 AB+HIV1 P24 AG SERPL QL IA: NEGATIVE
TREPONEMA PALLIDUM IGG+IGM AB [PRESENCE] IN SERUM OR PLASMA BY IMMUNOASSAY: NONREACTIVE

## 2024-09-13 PROCEDURE — 86593 SYPHILIS TEST NON-TREP QUANT: CPT

## 2024-09-13 PROCEDURE — 87340 HEPATITIS B SURFACE AG IA: CPT

## 2024-09-13 PROCEDURE — 87491 CHLMYD TRACH DNA AMP PROBE: CPT

## 2024-09-13 PROCEDURE — 99999 PR PBB SHADOW E&M-EST. PATIENT-LVL III: CPT | Mod: PBBFAC,,,

## 2024-09-13 PROCEDURE — 36415 COLL VENOUS BLD VENIPUNCTURE: CPT

## 2024-09-13 PROCEDURE — 87389 HIV-1 AG W/HIV-1&-2 AB AG IA: CPT

## 2024-09-13 PROCEDURE — 87591 N.GONORRHOEAE DNA AMP PROB: CPT

## 2024-09-13 NOTE — PROGRESS NOTES
Chief Complaint: Well Woman Exam     HPI:      Maria D Davis is a 44 y.o.  who presents today for well woman exam.  LMP: Patient's last menstrual period was 2024.  Today she is reporting two episodes of bleeding after intercourse.  Had heavy period like bleeding with second episode but no cramping or pelvic pain.  No pain during intercourse.  Has had this happen in the past and was told it was hormonal and nothing to worry about. Hx of recurrent UTIs, takes Macrobid prn post coitus.  No other issues, problems, or complaints. Specifically, patient denies abnormal vaginal discharge, pelvic pain, urinary problems, or changes in appetite. Ms. Davis is currently sexually active with a single male partner. She is currently using coitus interruptus  for contraception. She would like STD screening today.    Previous Pap: NILM, HPV negative (2022)  Previous Mammogram: BiRads: 1 T-C Score: 12.54% (2023)    Tobacco use:  Former  Alcohol use:  Not currently, on hiatus  Exercise regimen: Yes - , owns cycle studio    FH:  Breast cancer: none  Colon cancer: none  Ovarian cancer: none  Endometrial cancer: none    Ms. Davis confirms that she wears her seatbelt when riding in the car and does not text while driving.     OB History          4    Para   2    Term   2            AB   2    Living   2         SAB   1    IAB   1    Ectopic        Multiple        Live Births                     ROS:     GENERAL: Denies unintentional weight gain or weight loss. Feeling well overall.   SKIN: Denies rash or lesions.   HEENT: Denies headaches, or vision changes.   CARDIOVASCULAR: Denies palpitations or chest pain.   RESPIRATORY: Denies shortness of breath or dyspnea on exertion.  BREASTS: Denies lumps or nipple discharge.   ABDOMEN: Denies constipation, diarrhea, nausea, vomiting, change in appetite.  URINARY: Denies frequency, dysuria.  NEUROLOGIC: Denies syncope or weakness.    PSYCHIATRIC: Denies uncontrolled depression or anxiety.    Physical Exam:      PHYSICAL EXAM:  /70 (BP Location: Left arm, Patient Position: Sitting, BP Method: Medium (Manual))   Wt 56.3 kg (124 lb 0.1 oz)   LMP 09/02/2024   BMI 18.31 kg/m²   Body mass index is 18.31 kg/m².     APPEARANCE: Well nourished, well developed, in no acute distress.  PSYCH: Appropriate mood and affect.  SKIN: No acne or hirsutism  NECK: Neck symmetric without masses  NODES: No inguinal, axillary, or supraclavicular lymph node enlargement  ABDOMEN: Soft.  No tenderness or masses.    CARDIOVASCULAR: No edema of peripheral extremities  BREASTS: Symmetrical, no visible skin lesions. No palpable masses. No nipple discharge bilaterally.  PELVIC: Normal external genitalia without lesions.  Normal hair distribution.  Adequate perineal body, normal urethral meatus.  Vagina moist and well rugated. Without lesions. Vagina with old brown bloody discharge.  Cervix pink, without lesions, discharge or tenderness.  No ectropion noted. No significant cystocele or rectocele.  Bimanual exam shows uterus to be normal size, regular, mobile and nontender.  Adnexa without masses or tenderness.    Gyn note:      A female chaperone was present for the breast and pelvic exam.     Assessment/Plan:     Encounter for well woman exam with routine gynecological exam  -     Counseled patient regarding healthy diet and regular exercise, daily multivitamin, daily seat belt use.   -     BP normotensive  -     She denies abuse and feels safe at home.  -     Pap smear:  UTD   -     STD screening:  GCC collected, STD blood panel ordered   -     MMG:  Orders placed, pt to schedule    Screening for STD (sexually transmitted disease)  -     C. trachomatis/N. gonorrhoeae by AMP DNA  -     HIV 1/2 Ag/Ab (4th Gen); Future; Expected date: 09/13/2024  -     Hepatitis B Surface Antigen; Future; Expected date: 09/13/2024  -     Treponema Pallidium Antibodies IgG, IgM;  Future; Expected date: 09/13/2024    Postcoital bleeding  -     Affirm and GCC collected to r/o infection.  -     Discussed monitoring for now.  Also offered TVUS.  Pt would like to proceed with TVUS.  Orders placed, pt to schedule.  -     Vaginosis Screen by DNA Probe  -     US Pelvis Comp with Transvag NON-OB (xpd; Future; Expected date: 09/13/2024    Screening mammogram for breast cancer  -     Mammo Digital Screening Bilat w/ Artur; Future; Expected date: 09/13/2024    Vaginal discharge  -     Vaginosis Screen by DNA Probe    Follow up in about 1 year for annual exam or sooner PRN.    Counseling:     Patient was counseled today on current ASCCP pap guidelines, the recommendation for yearly physical exams, healthy diet and exercise routines, safe driving habits, and breast self awareness and annual mammograms. She is to see her PCP for other health maintenance.     Use of the [facility] Patient Portal discussed and encouraged during today's visit.   Counseling time: 15 minutes    Vilma Bennett (Maggie), LOS  Obstetrics and Gynecology  [facility]

## 2024-09-14 LAB — HBV SURFACE AG SERPL QL IA: NORMAL

## 2024-09-16 ENCOUNTER — HOSPITAL ENCOUNTER (EMERGENCY)
Facility: OTHER | Age: 44
Discharge: HOME | End: 2024-09-16
Attending: EMERGENCY MEDICINE
Payer: COMMERCIAL

## 2024-09-16 ENCOUNTER — NURSE TRIAGE (OUTPATIENT)
Dept: ADMINISTRATIVE | Facility: CLINIC | Age: 44
End: 2024-09-16
Payer: COMMERCIAL

## 2024-09-16 VITALS
SYSTOLIC BLOOD PRESSURE: 116 MMHG | OXYGEN SATURATION: 98 % | RESPIRATION RATE: 16 BRPM | DIASTOLIC BLOOD PRESSURE: 68 MMHG | HEIGHT: 69 IN | BODY MASS INDEX: 17.77 KG/M2 | TEMPERATURE: 100 F | WEIGHT: 120 LBS | HEART RATE: 78 BPM

## 2024-09-16 DIAGNOSIS — N93.8 DYSFUNCTIONAL UTERINE BLEEDING: ICD-10-CM

## 2024-09-16 DIAGNOSIS — N30.01 ACUTE CYSTITIS WITH HEMATURIA: Primary | ICD-10-CM

## 2024-09-16 LAB
B-HCG UR QL: NEGATIVE
BACTERIA #/AREA URNS HPF: ABNORMAL /HPF
BILIRUB UR QL STRIP: NEGATIVE
CLARITY UR: ABNORMAL
COLOR UR: YELLOW
CTP QC/QA: YES
GLUCOSE UR QL STRIP: NEGATIVE
HGB UR QL STRIP: ABNORMAL
KETONES UR QL STRIP: NEGATIVE
LEUKOCYTE ESTERASE UR QL STRIP: ABNORMAL
MICROSCOPIC COMMENT: ABNORMAL
NITRITE UR QL STRIP: POSITIVE
PH UR STRIP: >8 [PH] (ref 5–8)
PROT UR QL STRIP: ABNORMAL
RBC #/AREA URNS HPF: 66 /HPF (ref 0–4)
SP GR UR STRIP: 1.02 (ref 1–1.03)
SQUAMOUS #/AREA URNS HPF: 3 /HPF
URN SPEC COLLECT METH UR: ABNORMAL
UROBILINOGEN UR STRIP-ACNC: NEGATIVE EU/DL
WBC #/AREA URNS HPF: 51 /HPF (ref 0–5)

## 2024-09-16 PROCEDURE — 87088 URINE BACTERIA CULTURE: CPT | Performed by: PHYSICIAN ASSISTANT

## 2024-09-16 PROCEDURE — 87086 URINE CULTURE/COLONY COUNT: CPT | Performed by: PHYSICIAN ASSISTANT

## 2024-09-16 PROCEDURE — 81025 URINE PREGNANCY TEST: CPT | Performed by: EMERGENCY MEDICINE

## 2024-09-16 PROCEDURE — 81000 URINALYSIS NONAUTO W/SCOPE: CPT | Performed by: PHYSICIAN ASSISTANT

## 2024-09-16 PROCEDURE — 99284 EMERGENCY DEPT VISIT MOD MDM: CPT | Mod: 25

## 2024-09-16 PROCEDURE — 87186 SC STD MICRODIL/AGAR DIL: CPT | Performed by: PHYSICIAN ASSISTANT

## 2024-09-16 RX ORDER — CEPHALEXIN 500 MG/1
500 CAPSULE ORAL 2 TIMES DAILY
Qty: 14 CAPSULE | Refills: 0 | Status: SHIPPED | OUTPATIENT
Start: 2024-09-16 | End: 2024-09-23

## 2024-09-16 NOTE — ED PROVIDER NOTES
Encounter Date: 9/16/2024       History     Chief Complaint   Patient presents with    Vaginal Bleeding     Heavy vaginal bleeding and RLQ abdominal pain x a few days.      This is a 44-year-old female who presents to the ED with vaginal bleeding that worsened over the past 2 days. Patient states that she saw her Ob/gyn last week, but the bleeding worsened over the past 2 days.  Reports going through approximately 3 super tampons in 3 hours yesterday.  States that her menstrual period last month was 4 days late and much later than.  She also reports that menstrual period this month started on 09/02/2024, however, has persisted and been extremely heavy.  This is not normal for her.  There is associated right lower quadrant and suprapubic tenderness.  She has frequent UTIs, but no complaints of urinary symptoms at this time.  No concerns for STDs.  Denies fever, chills, N/V/D, vaginal discharge.    The history is provided by the patient.     Review of patient's allergies indicates:  No Known Allergies  Past Medical History:   Diagnosis Date    Thyroid disease      Past Surgical History:   Procedure Laterality Date    COLON SURGERY  2015    Resection after injury    COLPOSCOPY      FRACTURE SURGERY  07/23/2016    jeb in right femur, plate left ulna    LAPAROSCOPIC SPLENECTOMY      LOOP ELECTROSURGICAL EXCISION PROCEDURE (LEEP)       Family History   Problem Relation Name Age of Onset    Ovarian cancer Mother Mother     Alcohol abuse Mother Mother     Depression Mother Mother     Drug abuse Mother Mother     Hypertension Mother Mother     Skin cancer Mother Mother     Stroke Mother Mother      Social History     Tobacco Use    Smoking status: Former     Types: Cigarettes    Smokeless tobacco: Never   Substance Use Topics    Alcohol use: Not Currently     Comment: socially    Drug use: Never     Review of Systems  10 point ROS performed and negative except as stated in HPI   Physical Exam     Initial Vitals [09/16/24  1056]   BP Pulse Resp Temp SpO2   116/68 78 16 99.6 °F (37.6 °C) 98 %      MAP       --         Physical Exam    Constitutional: She appears well-developed and well-nourished.  Non-toxic appearance. She does not appear ill. No distress.   HENT:   Head: Normocephalic and atraumatic.   Eyes: Conjunctivae are normal.   Neck: Neck supple.   Cardiovascular:  Normal rate and regular rhythm.           Pulmonary/Chest: Effort normal. No tachypnea. No respiratory distress.   Abdominal: Abdomen is soft and flat. There is abdominal tenderness in the right lower quadrant and suprapubic area.   Vertical healed ex lap scar There is no rebound, no guarding and no tenderness at McBurney's point.   Musculoskeletal:      Cervical back: Neck supple.     Neurological: She is alert and oriented to person, place, and time.   Skin: Skin is warm, dry and intact.   Psychiatric: She has a normal mood and affect. Her speech is normal and behavior is normal.         ED Course   Procedures  Labs Reviewed   URINALYSIS, REFLEX TO URINE CULTURE - Abnormal       Result Value    Specimen UA Urine, Clean Catch      Color, UA Yellow      Appearance, UA Hazy (*)     pH, UA >8.0 (*)     Specific Gravity, UA 1.020      Protein, UA Trace (*)     Glucose, UA Negative      Ketones, UA Negative      Bilirubin (UA) Negative      Occult Blood UA 3+ (*)     Nitrite, UA Positive (*)     Urobilinogen, UA Negative      Leukocytes, UA 2+ (*)     Narrative:     Specimen Source->Urine   URINALYSIS MICROSCOPIC - Abnormal    RBC, UA 66 (*)     WBC, UA 51 (*)     Bacteria Moderate (*)     Squam Epithel, UA 3      Microscopic Comment SEE COMMENT      Narrative:     Specimen Source->Urine   CULTURE, URINE   CBC W/ AUTO DIFFERENTIAL   POCT URINE PREGNANCY    POC Preg Test, Ur Negative       Acceptable Yes            Imaging Results              US Pelvis Comp with Transvag NON-OB (xpd) (Final result)  Result time 09/16/24 12:30:11   Procedure changed from US  Pelvis Complete Non OB     Final result by Nelda Summers MD (09/16/24 12:30:11)                   Impression:      Normal exam for age.      Electronically signed by: Nelda Summers  Date:    09/16/2024  Time:    12:30               Narrative:    EXAMINATION:  US PELVIS COMP WITH TRANSVAG NON-OB (XPD)    CLINICAL HISTORY:  vaginal bleeding;    TECHNIQUE:  Transabdominal sonography of the pelvis was performed, followed by transvaginal sonography to better evaluate the uterus and ovaries.    COMPARISON:  None.    FINDINGS:  Uterus:    Size: 8.4 x 4.1 x 5.3 cm    The parenchyma is homogeneous.  Endometrium is normal caliber measuring 0.7 cm.    Right ovary:    Size: 3.2 x 1.5 x 2.5 cm    Left ovary:    Size: 3.4 x 1.2 x 2.7 cm    Free Fluid:    None.                                       Medications - No data to display  Medical Decision Making  Urgent evaluation of an afebrile 44-year-old female with dysfunctional uterine bleeding. Patient appears well and nontoxic with stable vital signs. She has very mild tenderness to palpation to suprapubic and right lower quadrant of her abdomen. No McBurney's point tenderness.  Initially planned for labs to check for anemia, ultrasound, and pelvic exam.  Patient declines labs and pelvic exam at this time.  States that she just had a pelvic exam done 3 days ago and has close follow up with her gynecologist this coming week.  She was more concerned about getting imaging done to rule out more acute findings.  Ultrasound done shows no acute abnormality.  UA shows evidence of acute cystitis with hematuria for which I will treat with a course of Keflex.  Doubt this is the acute cause of her dysfunctional uterine bleeding, though may be exacerbating her lower abdominal pain.  Given that patient has close outpatient follow-up, do feel comfortable with discharge at this time in the setting of reassuring ultrasound without completing labs or pelvic exam.  Patient with self-reported  significant stressors, possibly secondary to stress versus onset of menopause.  Would benefit from checking hormone levels in outpatient setting. Patient was instructed on quantify how much she is bleeding by using pads and tampons and given strict return precautions. Patient educated on signs and symptoms to monitor for and when to return to ED. Patient verbalized understanding agrees with treatment plan. All questions and concerns addressed.    Differential diagnosis includes but isn't limited to:   Dysfunctional uterine bleeding, acute cystitis, ovarian cysts, menopause    Amount and/or Complexity of Data Reviewed  Labs: ordered.    Risk  Prescription drug management.                               Clinical Impression:  Final diagnoses:  [N93.8] Dysfunctional uterine bleeding  [N30.01] Acute cystitis with hematuria (Primary)          ED Disposition Condition    Discharge Stable          ED Prescriptions       Medication Sig Dispense Start Date End Date Auth. Provider    cephALEXin (KEFLEX) 500 MG capsule Take 1 capsule (500 mg total) by mouth 2 (two) times a day. for 7 days 14 capsule 9/16/2024 9/23/2024 Radha Cash PA-C          Follow-up Information    None          Radha Cash PA-C  09/16/24 [id number]

## 2024-09-16 NOTE — ED TRIAGE NOTES
Pt last period was Aug 17th. She started bleeding heavily since yesterday. The is having right lower abd pain. Denies vomiting or diarrhea.

## 2024-09-16 NOTE — FIRST PROVIDER EVALUATION
Emergency Department TeleTriage Encounter Note      CHIEF COMPLAINT    Chief Complaint   Patient presents with    Vaginal Bleeding     Heavy vaginal bleeding and RLQ abdominal pain x a few days.        VITAL SIGNS   Initial Vitals [09/16/24 1056]   BP Pulse Resp Temp SpO2   116/68 78 16 99.6 °F (37.6 °C) 98 %      MAP       --            ALLERGIES    Review of patient's allergies indicates:  No Known Allergies    PROVIDER TRIAGE NOTE  This is a teletriage evaluation of a 44 y.o. female presenting to the ED complaining of vaginal bleeding. Patient reports heavy vaginal bleeding for the past 2-3 days. Also complaining of pelvic pain. Saw her OBGYN who ordered ultrasound but has not had it done yet.    Patient is alert and oriented. She speaks in complete sentences. She is sitting upright in the chair in no distress.     Initial orders will be placed and care will be transferred to an alternate provider when patient is roomed for a full evaluation. Any additional orders and the final disposition will be determined by that provider.         ORDERS  Labs Reviewed   CBC W/ AUTO DIFFERENTIAL   COMPREHENSIVE METABOLIC PANEL   URINALYSIS, REFLEX TO URINE CULTURE   POCT URINE PREGNANCY       Result Value    POC Preg Test, Ur Negative       Acceptable Yes         ED Orders (720h ago, onward)      Start Ordered     Status Ordering Provider    09/16/24 1124 09/16/24 1123  US Pelvis Complete Non OB  1 time imaging         Ordered ZAYDAALEXIA    09/16/24 1123 09/16/24 1122  Vital signs  Every 2 hours         Ordered ZAYDAALEXIA G.    09/16/24 1122 09/16/24 1121  POCT urine pregnancy  Once         Final result JUSTIN THOMAS    09/16/24 1122 09/16/24 1122  Insert peripheral IV  Once         Ordered ZAYDAALEXIA    09/16/24 1122 09/16/24 1122  CBC W/ AUTO DIFFERENTIAL  STAT         Ordered ZAYDAALEXIA.    09/16/24 1122 09/16/24 1122  Comp. Metabolic Panel  STAT         Ordered ZAYDAALEXIA G.     09/16/24 1122 09/16/24 1122  Urinalysis, Reflex to Urine Culture Urine, Clean Catch  STAT         Ordered ALEXIA PRIEST              Virtual Visit Note: The provider triage portion of this emergency department evaluation and documentation was performed via [employer], a HIPAA-compliant telemedicine application, in concert with a tele-presenter in the room. A face to face patient evaluation with one of my colleagues will occur once the patient is placed in an emergency department room.      DISCLAIMER: This note was prepared with [employer] voice recognition transcription software. Garbled syntax, mangled pronouns, and other bizarre constructions may be attributed to that software system.

## 2024-09-16 NOTE — TELEPHONE ENCOUNTER
Patient states she is having sharp lower abdominal pain with heavy bleeding. She is soaking through 2 pads /h for approx 24h.  I have advised as per protocol.    Reason for Disposition   SEVERE vaginal bleeding (e.g., soaking 2 pads or tampons per hour and present 2 or more hours; 1 menstrual cup every 2 hours)    Additional Information   Negative: Shock suspected (e.g., cold/pale/clammy skin, too weak to stand, low BP, rapid pulse)   Negative: Difficult to awaken or acting confused (e.g., disoriented, slurred speech)   Negative: Passed out (e.g., fainted, lost consciousness, blacked out and was not responding)   Negative: Sounds like a life-threatening emergency to the triager   Negative: Followed a genital area injury (e.g., vagina, vulva)   Negative: Pregnant 20 or more weeks   Negative: Pregnant < 20 weeks  (less than 5 months)   Negative: Postpartum (from 0 to 6 weeks after delivery)   Negative: Bleeding occurring > 12 months after menopause   Negative: Bleeding from sexual abuse or rape   Negative: [1] Vaginal discharge is main symptom AND [2] small amount of blood   Negative: SEVERE abdominal pain   Negative: SEVERE dizziness (e.g., unable to stand, requires support to walk, feels like passing out now)    Protocols used: Vaginal Bleeding - [id number]

## 2024-09-18 LAB — BACTERIA UR CULT: ABNORMAL

## 2024-09-20 ENCOUNTER — OFFICE VISIT (OUTPATIENT)
Dept: OBSTETRICS AND GYNECOLOGY | Facility: CLINIC | Age: 44
End: 2024-09-20
Payer: COMMERCIAL

## 2024-09-20 ENCOUNTER — LAB VISIT (OUTPATIENT)
Dept: LAB | Facility: OTHER | Age: 44
End: 2024-09-20
Payer: COMMERCIAL

## 2024-09-20 VITALS
BODY MASS INDEX: 18.05 KG/M2 | WEIGHT: 122.25 LBS | SYSTOLIC BLOOD PRESSURE: 104 MMHG | DIASTOLIC BLOOD PRESSURE: 62 MMHG

## 2024-09-20 DIAGNOSIS — N93.9 ABNORMAL UTERINE BLEEDING (AUB): Primary | ICD-10-CM

## 2024-09-20 DIAGNOSIS — N93.9 ABNORMAL UTERINE BLEEDING (AUB): ICD-10-CM

## 2024-09-20 LAB
BASOPHILS # BLD AUTO: 0.11 K/UL (ref 0–0.2)
BASOPHILS NFR BLD: 1.7 % (ref 0–1.9)
DIFFERENTIAL METHOD BLD: ABNORMAL
EOSINOPHIL # BLD AUTO: 0.1 K/UL (ref 0–0.5)
EOSINOPHIL NFR BLD: 2 % (ref 0–8)
ERYTHROCYTE [DISTWIDTH] IN BLOOD BY AUTOMATED COUNT: 12.5 % (ref 11.5–14.5)
HCG INTACT+B SERPL-ACNC: <1.2 MIU/ML
HCT VFR BLD AUTO: 41 % (ref 37–48.5)
HGB BLD-MCNC: 13.6 G/DL (ref 12–16)
IMM GRANULOCYTES # BLD AUTO: 0.01 K/UL (ref 0–0.04)
IMM GRANULOCYTES NFR BLD AUTO: 0.2 % (ref 0–0.5)
LYMPHOCYTES # BLD AUTO: 2.6 K/UL (ref 1–4.8)
LYMPHOCYTES NFR BLD: 40.1 % (ref 18–48)
MCH RBC QN AUTO: 32.6 PG (ref 27–31)
MCHC RBC AUTO-ENTMCNC: 33.2 G/DL (ref 32–36)
MCV RBC AUTO: 98 FL (ref 82–98)
MONOCYTES # BLD AUTO: 0.6 K/UL (ref 0.3–1)
MONOCYTES NFR BLD: 9.3 % (ref 4–15)
NEUTROPHILS # BLD AUTO: 3 K/UL (ref 1.8–7.7)
NEUTROPHILS NFR BLD: 46.7 % (ref 38–73)
NRBC BLD-RTO: 0 /100 WBC
PLATELET # BLD AUTO: 355 K/UL (ref 150–450)
PMV BLD AUTO: 9.6 FL (ref 9.2–12.9)
RBC # BLD AUTO: 4.17 M/UL (ref 4–5.4)
WBC # BLD AUTO: 6.46 K/UL (ref 3.9–12.7)

## 2024-09-20 PROCEDURE — 99999 PR PBB SHADOW E&M-EST. PATIENT-LVL III: CPT | Mod: PBBFAC,,,

## 2024-09-20 PROCEDURE — 36415 COLL VENOUS BLD VENIPUNCTURE: CPT

## 2024-09-20 PROCEDURE — 85025 COMPLETE CBC W/AUTO DIFF WBC: CPT

## 2024-09-20 PROCEDURE — 99213 OFFICE O/P EST LOW 20 MIN: CPT | Mod: S$GLB,,,

## 2024-09-20 PROCEDURE — 84702 CHORIONIC GONADOTROPIN TEST: CPT

## 2024-09-20 NOTE — PROGRESS NOTES
Chief Complaint:  F/u ED     HPI:     Patient is a 44 y.o.  who presents to clinic for follow up after recent ED visit for AUB.  She was seen for her annual on  with reports of bleeding after intercourse. She was not bleeding at the time of her visit but did have old brown bloody discharge on exam.  Affirm and GCC negative. Two days later she developed pelvic pain with heavy vaginal bleeding, weakness, and lightheadedness. Reports she was saturating approximately 3 super tampons in 3 hours which prompted her to message the clinic. Additionally she reports she had a very faint positive pregnancy test at home.  The nurse she spoke to instructed her to go to the ED. Pregnancy test in ED negative. Ultrasound in ED WNL. UA showed UTI.  Pt ultimately discharged home with Keflex.  Today she reports the bleeding has resolved completely.  Reports bleeding lasted for 36 hours and promptly stopped on Tuesday morning.  She has never had anything like this happen before.  Still feels slightly weak but much better than she did.      ROS:     CARDIOVASCULAR: No palpitations, no chest pain.  RESPIRATORY: No shortness of breath, no dyspnea on exertion.  HEMATOLOGIC: No easy bruising, no bleeding gums.    Physical Exam:      PHYSICAL EXAM:   Vitals:    24 1129   BP: 104/62   Weight: 55.4 kg (122 lb 3.9 oz)     Body mass index is 18.05 kg/m².      General: No acute distress; alert and engaged. Makes eye contact.   Cardiovascular:  Well perfused throughout.  Respiratory:  No accessory muscle use to breathe. Speaking comfortably in complete sentences.   Abdomen:  Soft, non-tender, non-distended.  Pelvic:  External genitalia and urethra within normal limits. Vagina without lesions, without discharge, without erythema, without ulcers.  Cervix without cervical motion tenderness, non-friable. normal size, mobile, non-tender.  No adnexal masses or tenderness palpated.  Skin:  Warm and dry, no diaphoresis.      Assessment/Plan:     Abnormal uterine bleeding (AUB)  -     CBC W/ AUTO DIFFERENTIAL; Future; Expected date: 09/20/2024  -     HCG, QUANTITATIVE, PREGNANCY; Future; Expected date: 09/20/2024    Exam WNL. Pt no longer with bleeding or with pain.  Pt appears well today. Will get CBC to check blood count since it wasn't done in ED.  Discussed unknown cause of bleeding. No indication of infection. US WNL.  Will get HCG to r/o pregnancy to be sure.  Since bleeding has resolved and patient no longer in pain I recommend if lab work is normal to monitor at this time and if she has another episode again or any more bleeding with intercourse to let me know.  Pt in agreement with plan.    Follow up PRN.    Use of the [facility] Patient Portal discussed and encouraged during today's visit.     Vilma Bennett (Maggie), LOS  Obstetrics and Gynecology  [facility]

## 2024-12-17 DIAGNOSIS — N39.0 RECURRENT UTI: ICD-10-CM

## 2024-12-18 RX ORDER — NITROFURANTOIN MACROCRYSTALS 50 MG/1
50 CAPSULE ORAL
Qty: 30 CAPSULE | Refills: 2 | Status: SHIPPED | OUTPATIENT
Start: 2024-12-18 | End: 2025-12-18

## 2025-02-12 ENCOUNTER — OFFICE VISIT (OUTPATIENT)
Dept: INTERNAL MEDICINE | Facility: CLINIC | Age: 45
End: 2025-02-12
Payer: MEDICAID

## 2025-02-12 ENCOUNTER — LAB VISIT (OUTPATIENT)
Dept: LAB | Facility: OTHER | Age: 45
End: 2025-02-12
Attending: STUDENT IN AN ORGANIZED HEALTH CARE EDUCATION/TRAINING PROGRAM
Payer: MEDICAID

## 2025-02-12 VITALS
BODY MASS INDEX: 18.84 KG/M2 | SYSTOLIC BLOOD PRESSURE: 104 MMHG | DIASTOLIC BLOOD PRESSURE: 70 MMHG | OXYGEN SATURATION: 100 % | HEART RATE: 70 BPM | WEIGHT: 127.19 LBS | HEIGHT: 69 IN

## 2025-02-12 DIAGNOSIS — Z13.1 SCREENING FOR DIABETES MELLITUS: ICD-10-CM

## 2025-02-12 DIAGNOSIS — D75.89 MACROCYTOSIS: ICD-10-CM

## 2025-02-12 DIAGNOSIS — Z13.6 SCREENING FOR CARDIOVASCULAR CONDITION: ICD-10-CM

## 2025-02-12 DIAGNOSIS — E03.9 HYPOTHYROIDISM, UNSPECIFIED TYPE: ICD-10-CM

## 2025-02-12 DIAGNOSIS — Z00.00 HEALTH MAINTENANCE EXAMINATION: Primary | ICD-10-CM

## 2025-02-12 DIAGNOSIS — Z00.00 HEALTH MAINTENANCE EXAMINATION: ICD-10-CM

## 2025-02-12 DIAGNOSIS — E03.9 HYPOTHYROIDISM (ACQUIRED): ICD-10-CM

## 2025-02-12 DIAGNOSIS — Z12.31 SCREENING MAMMOGRAM FOR BREAST CANCER: ICD-10-CM

## 2025-02-12 LAB
25(OH)D3+25(OH)D2 SERPL-MCNC: 19 NG/ML (ref 30–96)
ALBUMIN SERPL BCP-MCNC: 4 G/DL (ref 3.5–5.2)
ALP SERPL-CCNC: 64 U/L (ref 40–150)
ALT SERPL W/O P-5'-P-CCNC: 25 U/L (ref 10–44)
ANION GAP SERPL CALC-SCNC: 10 MMOL/L (ref 8–16)
AST SERPL-CCNC: 30 U/L (ref 10–40)
BASOPHILS # BLD AUTO: 0.1 K/UL (ref 0–0.2)
BASOPHILS NFR BLD: 1.4 % (ref 0–1.9)
BILIRUB SERPL-MCNC: 0.3 MG/DL (ref 0.1–1)
BUN SERPL-MCNC: 18 MG/DL (ref 6–20)
CALCIUM SERPL-MCNC: 9.4 MG/DL (ref 8.7–10.5)
CHLORIDE SERPL-SCNC: 105 MMOL/L (ref 95–110)
CHOLEST SERPL-MCNC: 174 MG/DL (ref 120–199)
CHOLEST/HDLC SERPL: 2.4 {RATIO} (ref 2–5)
CO2 SERPL-SCNC: 26 MMOL/L (ref 23–29)
CREAT SERPL-MCNC: 0.8 MG/DL (ref 0.5–1.4)
DIFFERENTIAL METHOD BLD: ABNORMAL
EOSINOPHIL # BLD AUTO: 0.6 K/UL (ref 0–0.5)
EOSINOPHIL NFR BLD: 8.7 % (ref 0–8)
ERYTHROCYTE [DISTWIDTH] IN BLOOD BY AUTOMATED COUNT: 13.2 % (ref 11.5–14.5)
EST. GFR  (NO RACE VARIABLE): >60 ML/MIN/1.73 M^2
ESTIMATED AVG GLUCOSE: 100 MG/DL (ref 68–131)
FOLATE SERPL-MCNC: 12 NG/ML (ref 4–24)
GLUCOSE SERPL-MCNC: 85 MG/DL (ref 70–110)
HBA1C MFR BLD: 5.1 % (ref 4–5.6)
HCT VFR BLD AUTO: 38.3 % (ref 37–48.5)
HDLC SERPL-MCNC: 73 MG/DL (ref 40–75)
HDLC SERPL: 42 % (ref 20–50)
HGB BLD-MCNC: 13 G/DL (ref 12–16)
IMM GRANULOCYTES # BLD AUTO: 0.01 K/UL (ref 0–0.04)
IMM GRANULOCYTES NFR BLD AUTO: 0.1 % (ref 0–0.5)
LDLC SERPL CALC-MCNC: 85.6 MG/DL (ref 63–159)
LYMPHOCYTES # BLD AUTO: 2.7 K/UL (ref 1–4.8)
LYMPHOCYTES NFR BLD: 37.1 % (ref 18–48)
MCH RBC QN AUTO: 33.5 PG (ref 27–31)
MCHC RBC AUTO-ENTMCNC: 33.9 G/DL (ref 32–36)
MCV RBC AUTO: 99 FL (ref 82–98)
MONOCYTES # BLD AUTO: 1 K/UL (ref 0.3–1)
MONOCYTES NFR BLD: 13.8 % (ref 4–15)
NEUTROPHILS # BLD AUTO: 2.9 K/UL (ref 1.8–7.7)
NEUTROPHILS NFR BLD: 38.9 % (ref 38–73)
NONHDLC SERPL-MCNC: 101 MG/DL
NRBC BLD-RTO: 0 /100 WBC
PLATELET # BLD AUTO: 351 K/UL (ref 150–450)
PMV BLD AUTO: 9.4 FL (ref 9.2–12.9)
POTASSIUM SERPL-SCNC: 3.9 MMOL/L (ref 3.5–5.1)
PROT SERPL-MCNC: 7.3 G/DL (ref 6–8.4)
RBC # BLD AUTO: 3.88 M/UL (ref 4–5.4)
SODIUM SERPL-SCNC: 141 MMOL/L (ref 136–145)
TRIGL SERPL-MCNC: 77 MG/DL (ref 30–150)
TSH SERPL DL<=0.005 MIU/L-ACNC: 3.55 UIU/ML (ref 0.4–4)
VIT B12 SERPL-MCNC: 597 PG/ML (ref 210–950)
WBC # BLD AUTO: 7.34 K/UL (ref 3.9–12.7)

## 2025-02-12 PROCEDURE — 3074F SYST BP LT 130 MM HG: CPT | Mod: CPTII,,, | Performed by: STUDENT IN AN ORGANIZED HEALTH CARE EDUCATION/TRAINING PROGRAM

## 2025-02-12 PROCEDURE — 84443 ASSAY THYROID STIM HORMONE: CPT | Performed by: STUDENT IN AN ORGANIZED HEALTH CARE EDUCATION/TRAINING PROGRAM

## 2025-02-12 PROCEDURE — 3044F HG A1C LEVEL LT 7.0%: CPT | Mod: CPTII,,, | Performed by: STUDENT IN AN ORGANIZED HEALTH CARE EDUCATION/TRAINING PROGRAM

## 2025-02-12 PROCEDURE — 99396 PREV VISIT EST AGE 40-64: CPT | Mod: S$PBB,,, | Performed by: STUDENT IN AN ORGANIZED HEALTH CARE EDUCATION/TRAINING PROGRAM

## 2025-02-12 PROCEDURE — 80061 LIPID PANEL: CPT | Performed by: STUDENT IN AN ORGANIZED HEALTH CARE EDUCATION/TRAINING PROGRAM

## 2025-02-12 PROCEDURE — 82746 ASSAY OF FOLIC ACID SERUM: CPT | Performed by: STUDENT IN AN ORGANIZED HEALTH CARE EDUCATION/TRAINING PROGRAM

## 2025-02-12 PROCEDURE — 83036 HEMOGLOBIN GLYCOSYLATED A1C: CPT | Performed by: STUDENT IN AN ORGANIZED HEALTH CARE EDUCATION/TRAINING PROGRAM

## 2025-02-12 PROCEDURE — 80053 COMPREHEN METABOLIC PANEL: CPT | Performed by: STUDENT IN AN ORGANIZED HEALTH CARE EDUCATION/TRAINING PROGRAM

## 2025-02-12 PROCEDURE — 99999 PR PBB SHADOW E&M-EST. PATIENT-LVL IV: CPT | Mod: PBBFAC,,, | Performed by: STUDENT IN AN ORGANIZED HEALTH CARE EDUCATION/TRAINING PROGRAM

## 2025-02-12 PROCEDURE — 1160F RVW MEDS BY RX/DR IN RCRD: CPT | Mod: CPTII,,, | Performed by: STUDENT IN AN ORGANIZED HEALTH CARE EDUCATION/TRAINING PROGRAM

## 2025-02-12 PROCEDURE — 82607 VITAMIN B-12: CPT | Performed by: STUDENT IN AN ORGANIZED HEALTH CARE EDUCATION/TRAINING PROGRAM

## 2025-02-12 PROCEDURE — 99214 OFFICE O/P EST MOD 30 MIN: CPT | Mod: PBBFAC | Performed by: STUDENT IN AN ORGANIZED HEALTH CARE EDUCATION/TRAINING PROGRAM

## 2025-02-12 PROCEDURE — 1159F MED LIST DOCD IN RCRD: CPT | Mod: CPTII,,, | Performed by: STUDENT IN AN ORGANIZED HEALTH CARE EDUCATION/TRAINING PROGRAM

## 2025-02-12 PROCEDURE — 3078F DIAST BP <80 MM HG: CPT | Mod: CPTII,,, | Performed by: STUDENT IN AN ORGANIZED HEALTH CARE EDUCATION/TRAINING PROGRAM

## 2025-02-12 PROCEDURE — 82306 VITAMIN D 25 HYDROXY: CPT | Performed by: STUDENT IN AN ORGANIZED HEALTH CARE EDUCATION/TRAINING PROGRAM

## 2025-02-12 PROCEDURE — 3008F BODY MASS INDEX DOCD: CPT | Mod: CPTII,,, | Performed by: STUDENT IN AN ORGANIZED HEALTH CARE EDUCATION/TRAINING PROGRAM

## 2025-02-12 PROCEDURE — 85025 COMPLETE CBC W/AUTO DIFF WBC: CPT | Performed by: STUDENT IN AN ORGANIZED HEALTH CARE EDUCATION/TRAINING PROGRAM

## 2025-02-12 RX ORDER — BUPROPION HYDROCHLORIDE 150 MG/1
TABLET, EXTENDED RELEASE ORAL
COMMUNITY
Start: 2024-11-02

## 2025-02-12 RX ORDER — LEVOTHYROXINE SODIUM 75 UG/1
75 TABLET ORAL DAILY
Qty: 90 TABLET | Refills: 1 | Status: SHIPPED | OUTPATIENT
Start: 2025-02-12

## 2025-02-12 NOTE — PROGRESS NOTES
Subjective:       Patient ID: Maria D Davis is a 44 y.o. female.    Chief Complaint: Health maintenance examination [Z00.00]    Patient is established with me, here today for the following:    History of Present Illness      MEDICATIONS:  She has been off Synthroid 75 mcg for about a week. Her daily medications include Lamictal, Levothyroxine, and Nitrofurantoin. She takes Nitrofurantoin prophylactically 2-3 times per week after sexual intercourse for chronic UTI prevention. She uses Gabapentin as needed for nerve damage-related itching, though reports infrequent use. She started Wellbutrin in November for ADHD management.    ORTHOSTATIC SYMPTOMS:  She experiences dizziness upon standing requiring her to bend over to alleviate symptoms.    GYNECOLOGIC HISTORY:  She had regular menstrual cycles until August, when she experienced a possible miscarriage with 36 hours of bleeding and associated pain, confirmed by positive pregnancy test. Since then, she reports irregular menstrual cycles, including two periods in December and a cycle that was 6 days late this month.    PAST MEDICAL HISTORY:  History significant for splenectomy, non-severe meningitis in high school, and anemia.      ROS:  Skin: +itching  Neurological: +dizziness        Health maintenance -   Mammogram BI-RADS 1 in JULY2023.  Denies history of abnormal mammogram.  Family history of ovarian cancer.  Last pap performed JAN2022.   History of abnormal pap smear.  Required LEEP and colposcopy previously.  UTD on Tdap, COVID primary/booster, PCV13 vaccinations.  Due for influenza, COVID, PCV20, meningococcal vaccinations.  Started smoking in her early 20's. Stopped smoking at age [age].  Denies current alcohol use.   Denies drug use.  Completed HIV and hepatitis C screening.  Lab Results   Component Value Date    LDLCALC 97.2 10/02/2023   The 10-year ASCVD risk score (Levy PERES, et al., 2019) is: 0.2%  Lab Results   Component Value Date    HGBA1C 4.9  "10/02/2023     Began menarche at age 19.   [id number]  Denies gestational diabetes and HTN.  Currently sexually active with male partner.  Not currently using contraception.     Hypothyroidism -   Taking levothyroxine as directed.  Following with Dr. Giron for endocrinology routinely.  Lab Results   Component Value Date    TSH 2.862 10/02/2023    TSH 1.716 12/15/2022    TSH 1.860 2022         Current Outpatient Medications   Medication Instructions    atomoxetine (STRATTERA) 40 mg, Oral, Daily    clonazePAM (KLONOPIN) 1 mg, Oral, Daily PRN    diclofenac sodium (VOLTAREN) 2 g, Topical (Top), 3 times daily after meals PRN    gabapentin (NEURONTIN) 300 mg, Oral, Nightly    lamoTRIgine (LAMICTAL) 150 mg, Oral, Daily    levothyroxine (SYNTHROID) 75 mcg, Oral    multivitamin (THERAGRAN) per tablet 1 tablet, Oral, Daily    nitrofurantoin (MACRODANTIN) 100 MG capsule SMARTSI Capsule(s) By Mouth PRN    nitrofurantoin (MACRODANTIN) 50 mg, Oral, As needed (PRN)    tiZANidine (ZANAFLEX) 2-4 mg, Oral, 2 times daily PRN     Objective:      Vitals:    25 1333   BP: 104/70   Pulse: 70   SpO2: 100%   Weight: 57.7 kg (127 lb 3.3 oz)   Height: 5' 9" (1.753 m)   PainSc: 0-No pain     Body mass index is 18.78 kg/m².    Physical Exam  Vitals reviewed.   Constitutional:       General: She is not in acute distress.     Appearance: Normal appearance. She is not ill-appearing or diaphoretic.   HENT:      Head: Normocephalic and atraumatic.      Right Ear: Tympanic membrane, ear canal and external ear normal. There is no impacted cerumen.      Left Ear: Tympanic membrane, ear canal and external ear normal. There is no impacted cerumen.      Nose: Nose normal. No rhinorrhea.      Mouth/Throat:      Mouth: Mucous membranes are moist.      Pharynx: Oropharynx is clear. No oropharyngeal exudate or posterior oropharyngeal erythema.   Eyes:      General: No scleral icterus.        Right eye: No discharge.         Left eye: No " discharge.      Conjunctiva/sclera: Conjunctivae normal.   Neck:      Thyroid: No thyromegaly or thyroid tenderness.      Trachea: Trachea normal.   Cardiovascular:      Rate and Rhythm: Normal rate and regular rhythm.      Heart sounds: Normal heart sounds. No murmur heard.     No friction rub. No gallop.   Pulmonary:      Effort: Pulmonary effort is normal. No respiratory distress.      Breath sounds: Normal breath sounds. No stridor. No wheezing, rhonchi or rales.   Abdominal:      General: There is no distension.      Palpations: Abdomen is soft.      Tenderness: There is no abdominal tenderness. There is no guarding or rebound.   Musculoskeletal:         General: No swelling or deformity.      Cervical back: Neck supple.   Lymphadenopathy:      Head:      Right side of head: No submandibular or posterior auricular adenopathy.      Left side of head: No submandibular or posterior auricular adenopathy.      Cervical: No cervical adenopathy.      Right cervical: No superficial, deep or posterior cervical adenopathy.     Left cervical: No superficial, deep or posterior cervical adenopathy.      Upper Body:      Right upper body: No supraclavicular adenopathy.      Left upper body: No supraclavicular adenopathy.   Skin:     General: Skin is warm and dry.   Neurological:      General: No focal deficit present.      Mental Status: She is alert. Mental status is at baseline.      Gait: Gait normal.   Psychiatric:         Mood and Affect: Mood normal.         Behavior: Behavior normal.         Assessment:       1. Health maintenance examination    2. Screening for diabetes mellitus    3. Screening for cardiovascular condition    4. Screening mammogram for breast cancer    5. Hypothyroidism (acquired)    6. Macrocytosis    7. Hypothyroidism, unspecified type        Plan:   Health maintenance examination  -     Vitamin D; Future; Expected date: 02/12/2025  -     CBC Auto Differential; Future; Expected date: 02/12/2025  -      Hemoglobin A1C; Future; Expected date: 02/12/2025  -     Lipid Panel; Future; Expected date: 02/12/2025  -     TSH; Future; Expected date: 02/12/2025  -     Comprehensive Metabolic Panel; Future; Expected date: 02/12/2025  -     FOLATE; Future; Expected date: 02/12/2025  -     Vitamin B12; Future; Expected date: 02/12/2025    Screening for diabetes mellitus  -     Hemoglobin A1C; Future; Expected date: 02/12/2025    Screening for cardiovascular condition  -     Lipid Panel; Future; Expected date: 02/12/2025    Screening mammogram for breast cancer  -     Mammo Digital Screening Bilat w/ Artur; Future; Expected date: 02/12/2025    Hypothyroidism (acquired)  -     Ambulatory referral/consult to Endocrinology; Future; Expected date: 02/19/2025    Macrocytosis  -     FOLATE; Future; Expected date: 02/12/2025  -     Vitamin B12; Future; Expected date: 02/12/2025    Hypothyroidism, unspecified type  -     levothyroxine (SYNTHROID) 75 MCG tablet; Take 1 tablet (75 mcg total) by mouth once daily.  Dispense: 90 tablet; Refill: 1      Assessment & Plan    IMPRESSION:  - Assessed thyroid function and medication needs, considering recent medication lapse and potential impact on menstrual cycle  - Evaluated B12 levels due to slightly elevated mean corpuscular volume  - Considered potential orthostatic hypotension  - Reviewed vaccination history, particularly pneumococcal and meningococcal vaccines, given patient's asplenic status    THYROID DISORDER:  - Explained potential thyroid impact on menstrual cycle.  - Refilled levothyroxine 75 mcg daily at [facility]  - Ordered thyroid function tests (TSH).  - Advised follow up in 1 year if thyroid levels are normal, or in 6 months with potential interim lab check if thyroid levels are abnormal.    RECURRENT URINARY TRACT INFECTIONS:  - Noted patient reports chronic UTIs, especially after sexual activity.    NERVE DAMAGE:  - Noted patient reports having nerve damage from previous  injuries.  - Prescribed Gabapentin for severe itching related to nerve damage.  - Advised patient to take Gabapentin more consistently for better symptom management.    ATTENTION DEFICIT HYPERACTIVITY DISORDER (ADHD):  - Noted patient is being treated for ADHD by psychiatrist Dr. Saenz.  - Confirmed patient started Wellbutrin in November for ADHD treatment, as a trial before considering stimulants.    ASPLENIA:  - Confirmed patient's absence of spleen since 2015.  - Provided information on vaccination importance for asplenic patients.  - Recommend flu and COVID vaccinations due to patient's asplenic status.  - Recommend pneumococcal vaccine and meningococcal vaccines for asplenic patient.  - Scheduled follow-up for nurse visit to receive pneumococcal vaccine on a Thursday when patient has a lighter teaching schedule.    HISTORY OF MENINGITIS:  - Noted patient reports history of meningitis in high school.    ORTHOSTATIC HYPOTENSION:  - Noted patient reports experiencing dizziness upon standing, requiring her to bend over to alleviate symptoms.  - Acknowledged previous negative workup for dizziness.  - Educated on orthostatic hypotension, its typical presentation in athletic individuals, and management strategies.  - Recommend increased hydration and electrolyte intake to manage dizziness symptoms.    BIPOLAR DISORDER:  - Confirmed patient is taking Lamictal daily for bipolar disorder management.    MENSTRUAL IRREGULARITIES:  - Noted patient reports recent changes in menstrual cycle, including two periods in December and a late period in the current month.  - Confirmed patient denies current heavy bleeding (more than 5 pads or tampons in a single day).  - Explained potential thyroid impact on menstrual cycle.  - Recommend monitoring the situation, expecting it to normalize over time.    ANEMIA:  - Noted patient reports history of anemia.  - Observed improvement in blood counts since October 2023.  - Noted slightly  larger mean corpuscular volume (MCV) of RBCs.  - Ordered CBC, B12 and folate levels to investigate possible deficiency.    LABS:  - Ordered: Metabolic panel, Mammogram.    FOLLOW UP:  - Additional notes: Contact office if symptoms worsen or new concerns arise.         Maria Isabel Senior MD  2/12/2025

## 2025-06-18 DIAGNOSIS — N39.0 RECURRENT UTI: ICD-10-CM

## 2025-06-18 RX ORDER — NITROFURANTOIN MACROCRYSTALS 50 MG/1
50 CAPSULE ORAL
Qty: 30 CAPSULE | Refills: 2 | Status: SHIPPED | OUTPATIENT
Start: 2025-06-18 | End: 2026-06-18

## 2025-08-08 DIAGNOSIS — E03.9 HYPOTHYROIDISM, UNSPECIFIED TYPE: ICD-10-CM

## 2025-08-08 RX ORDER — LEVOTHYROXINE SODIUM 75 UG/1
75 TABLET ORAL
Qty: 90 TABLET | Refills: 1 | Status: SHIPPED | OUTPATIENT
Start: 2025-08-08